# Patient Record
Sex: FEMALE | Race: WHITE | Employment: OTHER | URBAN - METROPOLITAN AREA
[De-identification: names, ages, dates, MRNs, and addresses within clinical notes are randomized per-mention and may not be internally consistent; named-entity substitution may affect disease eponyms.]

---

## 2017-01-23 ENCOUNTER — OFFICE VISIT (OUTPATIENT)
Dept: FAMILY MEDICINE CLINIC | Age: 62
End: 2017-01-23

## 2017-01-23 VITALS
BODY MASS INDEX: 22.19 KG/M2 | SYSTOLIC BLOOD PRESSURE: 104 MMHG | RESPIRATION RATE: 12 BRPM | DIASTOLIC BLOOD PRESSURE: 60 MMHG | WEIGHT: 155 LBS | HEIGHT: 70 IN | HEART RATE: 66 BPM

## 2017-01-23 DIAGNOSIS — Z00.00 WELL ADULT EXAM: Primary | ICD-10-CM

## 2017-01-23 PROCEDURE — 99396 PREV VISIT EST AGE 40-64: CPT | Performed by: FAMILY MEDICINE

## 2017-01-23 ASSESSMENT — PATIENT HEALTH QUESTIONNAIRE - PHQ9
SUM OF ALL RESPONSES TO PHQ QUESTIONS 1-9: 0
1. LITTLE INTEREST OR PLEASURE IN DOING THINGS: 0
2. FEELING DOWN, DEPRESSED OR HOPELESS: 0
SUM OF ALL RESPONSES TO PHQ9 QUESTIONS 1 & 2: 0

## 2017-01-25 LAB
HPV COMMENT: ABNORMAL
HPV TYPE 16: NOT DETECTED
HPV TYPE 18: NOT DETECTED
HPVOH (OTHER TYPES): DETECTED

## 2017-01-27 ENCOUNTER — TELEPHONE (OUTPATIENT)
Dept: FAMILY MEDICINE CLINIC | Age: 62
End: 2017-01-27

## 2017-01-27 DIAGNOSIS — R87.810 CERVICAL HIGH RISK HPV (HUMAN PAPILLOMAVIRUS) TEST POSITIVE: ICD-10-CM

## 2017-01-31 ENCOUNTER — TELEPHONE (OUTPATIENT)
Dept: FAMILY MEDICINE CLINIC | Age: 62
End: 2017-01-31

## 2017-03-27 ENCOUNTER — HOSPITAL ENCOUNTER (OUTPATIENT)
Dept: MAMMOGRAPHY | Age: 62
Discharge: HOME | End: 2017-03-27
Attending: FAMILY MEDICINE | Admitting: FAMILY MEDICINE

## 2017-03-27 DIAGNOSIS — Z12.31 VISIT FOR SCREENING MAMMOGRAM: ICD-10-CM

## 2017-04-05 ENCOUNTER — TELEPHONE (OUTPATIENT)
Dept: FAMILY MEDICINE CLINIC | Age: 62
End: 2017-04-05

## 2017-04-05 ENCOUNTER — HOSPITAL ENCOUNTER (OUTPATIENT)
Dept: WOMENS IMAGING | Age: 62
Discharge: HOME | End: 2017-04-05
Attending: FAMILY MEDICINE | Admitting: FAMILY MEDICINE

## 2017-04-05 DIAGNOSIS — R92.8 ABNORMAL MAMMOGRAM: ICD-10-CM

## 2017-04-05 DIAGNOSIS — R92.8 OTHER ABNORMAL AND INCONCLUSIVE FINDINGS ON DIAGNOSTIC IMAGING OF BREAST: ICD-10-CM

## 2017-04-21 ENCOUNTER — PAT TELEPHONE (OUTPATIENT)
Dept: PREADMISSION TESTING | Age: 62
End: 2017-04-21

## 2017-04-21 VITALS — HEIGHT: 71 IN | BODY MASS INDEX: 21.7 KG/M2 | WEIGHT: 155 LBS

## 2017-04-24 ENCOUNTER — HOSPITAL ENCOUNTER (OUTPATIENT)
Dept: ENDOSCOPY | Age: 62
Discharge: HOME | End: 2017-04-24
Attending: INTERNAL MEDICINE | Admitting: INTERNAL MEDICINE

## 2017-04-24 VITALS
HEIGHT: 71 IN | OXYGEN SATURATION: 98 % | DIASTOLIC BLOOD PRESSURE: 56 MMHG | BODY MASS INDEX: 21.47 KG/M2 | WEIGHT: 153.33 LBS | TEMPERATURE: 97 F | RESPIRATION RATE: 18 BRPM | HEART RATE: 68 BPM | SYSTOLIC BLOOD PRESSURE: 115 MMHG

## 2017-04-24 RX ORDER — SODIUM CHLORIDE 9 MG/ML
INJECTION, SOLUTION INTRAVENOUS CONTINUOUS
Status: DISCONTINUED | OUTPATIENT
Start: 2017-04-24 | End: 2017-04-25 | Stop reason: HOSPADM

## 2017-04-24 RX ORDER — SODIUM CHLORIDE 0.9 % (FLUSH) 0.9 %
10 SYRINGE (ML) INJECTION PRN
Status: DISCONTINUED | OUTPATIENT
Start: 2017-04-24 | End: 2017-04-25 | Stop reason: HOSPADM

## 2017-04-24 RX ORDER — SODIUM CHLORIDE 0.9 % (FLUSH) 0.9 %
10 SYRINGE (ML) INJECTION EVERY 12 HOURS SCHEDULED
Status: DISCONTINUED | OUTPATIENT
Start: 2017-04-24 | End: 2017-04-25 | Stop reason: HOSPADM

## 2017-04-24 RX ORDER — ONDANSETRON 2 MG/ML
4 INJECTION INTRAMUSCULAR; INTRAVENOUS
Status: ACTIVE | OUTPATIENT
Start: 2017-04-24 | End: 2017-04-24

## 2017-04-24 RX ADMIN — SODIUM CHLORIDE: 9 INJECTION, SOLUTION INTRAVENOUS at 08:42

## 2017-04-24 ASSESSMENT — ENCOUNTER SYMPTOMS: SHORTNESS OF BREATH: 0

## 2017-04-24 ASSESSMENT — PAIN SCALES - GENERAL
PAINLEVEL_OUTOF10: 0

## 2017-04-24 ASSESSMENT — PAIN - FUNCTIONAL ASSESSMENT: PAIN_FUNCTIONAL_ASSESSMENT: 0-10

## 2017-10-03 ENCOUNTER — TELEPHONE (OUTPATIENT)
Dept: FAMILY MEDICINE CLINIC | Age: 62
End: 2017-10-03

## 2017-10-03 DIAGNOSIS — R92.8 ABNORMAL MAMMOGRAM OF RIGHT BREAST: Primary | ICD-10-CM

## 2017-10-03 NOTE — TELEPHONE ENCOUNTER
Patient calling because she received a letter in the mail from her last [address] in April that she is due for her 6 month diagnostic mammogram. She is requesting  order this so she can have it done. Please send her a [employer] message when this has been placed so she can schedule.

## 2017-10-03 NOTE — TELEPHONE ENCOUNTER
Call pt and let her know I sent in diagnostic mammogram and an ultrasound of right breast    Please document call and then close encounter.   thanks

## 2017-10-17 ENCOUNTER — HOSPITAL ENCOUNTER (OUTPATIENT)
Dept: WOMENS IMAGING | Age: 62
Discharge: HOME | End: 2017-10-17
Admitting: FAMILY MEDICINE

## 2017-10-17 DIAGNOSIS — R92.8 ABNORMAL MAMMOGRAM OF RIGHT BREAST: ICD-10-CM

## 2017-10-17 DIAGNOSIS — R92.8 OTHER ABNORMAL AND INCONCLUSIVE FINDINGS ON DIAGNOSTIC IMAGING OF BREAST: ICD-10-CM

## 2018-02-21 ENCOUNTER — OFFICE VISIT (OUTPATIENT)
Dept: FAMILY MEDICINE CLINIC | Age: 63
End: 2018-02-21

## 2018-02-21 VITALS
RESPIRATION RATE: 12 BRPM | OXYGEN SATURATION: 96 % | SYSTOLIC BLOOD PRESSURE: 100 MMHG | DIASTOLIC BLOOD PRESSURE: 66 MMHG | WEIGHT: 159 LBS | BODY MASS INDEX: 22.76 KG/M2 | HEART RATE: 74 BPM | HEIGHT: 70 IN

## 2018-02-21 DIAGNOSIS — Z23 NEED FOR HEPATITIS A VACCINATION: ICD-10-CM

## 2018-02-21 DIAGNOSIS — Z01.419 ENCOUNTER FOR GYNECOLOGICAL EXAMINATION WITHOUT ABNORMAL FINDING: Primary | ICD-10-CM

## 2018-02-21 DIAGNOSIS — Z23 NEED FOR HEPATITIS B VACCINATION: ICD-10-CM

## 2018-02-21 DIAGNOSIS — Z11.3 SCREEN FOR STD (SEXUALLY TRANSMITTED DISEASE): ICD-10-CM

## 2018-02-21 LAB
HBV SURFACE AB TITR SER: <3.5 MIU/ML
HEPATITIS B CORE IGM ANTIBODY: NORMAL
HEPATITIS B SURFACE ANTIGEN INTERPRETATION: NORMAL
HEPATITIS C ANTIBODY INTERPRETATION: NORMAL

## 2018-02-21 PROCEDURE — 90471 IMMUNIZATION ADMIN: CPT | Performed by: FAMILY MEDICINE

## 2018-02-21 PROCEDURE — 36415 COLL VENOUS BLD VENIPUNCTURE: CPT | Performed by: FAMILY MEDICINE

## 2018-02-21 PROCEDURE — 90746 HEPB VACCINE 3 DOSE ADULT IM: CPT | Performed by: FAMILY MEDICINE

## 2018-02-21 PROCEDURE — 90632 HEPA VACCINE ADULT IM: CPT | Performed by: FAMILY MEDICINE

## 2018-02-21 PROCEDURE — 99396 PREV VISIT EST AGE 40-64: CPT | Performed by: FAMILY MEDICINE

## 2018-02-21 PROCEDURE — 90472 IMMUNIZATION ADMIN EACH ADD: CPT | Performed by: FAMILY MEDICINE

## 2018-02-21 NOTE — PROGRESS NOTES
Annual GYN Visit      Shilpa Rust  YOB: 1955    Date of Service:  2/21/2018    Chief Complaint:   Roberta Hdz is a 58 y.o. female who presents for routine annual gynecologic visit. HPI:  Patient is postmenopausal- No LMP recorded. Patient is postmenopausal..  She denies amenorrhea, dyspareunia, pelvic pain, genital ulcers, vaginal discharge, vulvar/vaginal itching, vulvar/vaginal irritation/pain, urinary frequency, urinary urgency and urinary incontinence. Menopausal/perimenopausal symptoms: none. Hormone therapy side effects: not applicable. No Known Allergies  Outpatient Prescriptions Marked as Taking for the 2/21/18 encounter (Office Visit) with Kalee Page MD   Medication Sig Dispense Refill    B Complex Vitamins (VITAMIN B COMPLEX PO) Take by mouth      Multiple Vitamins-Minerals (THERAPEUTIC MULTIVITAMIN-MINERALS) tablet Take 1 tablet by mouth daily      Calcium Citrate-Vitamin D (CALCIUM + D PO) Take by mouth      Ascorbic Acid (VITAMIN C) 500 MG tablet Take 500 mg by mouth daily         Preventive Care and Risk Factor Assessment  Health Maintenance   Topic Date Due    Hepatitis C screen  1955    HIV screen  01/13/1969    Shingles Vaccine (1 of 2 - 2 Dose Series) 01/13/2004    Flu vaccine (1) 09/01/2017    Cervical cancer screen  01/23/2018    Breast cancer screen  10/17/2019    Lipid screen  12/19/2021    Colon cancer screen colonoscopy  04/24/2022    DTaP/Tdap/Td vaccine (2 - Td) 11/14/2026      Hx abnormal PAP: yes - normal pap but +HPV one year ago  Sexual activity: not sexually active.   Hx of STD: no  Hx of abnormal mammogram: no  Self-breast exams: yes  FH of breast cancer: no  FH of GYN cancer: no   Exercise: regularly  History   Smoking Status    Never Smoker   Smokeless Tobacco    Never Used      History   Alcohol Use    2.4 oz/week    4 Glasses of wine per week        Immunization History   Administered Date(s) Administered    Tdap

## 2018-02-22 LAB
C TRACH DNA GENITAL QL NAA+PROBE: NEGATIVE
CANDIDA SPECIES, DNA PROBE: NORMAL
GARDNERELLA VAGINALIS, DNA PROBE: NORMAL
HIV AG/AB: NORMAL
HIV ANTIGEN: NORMAL
HIV-1 ANTIBODY: NORMAL
HIV-2 AB: NORMAL
N. GONORRHOEAE DNA: NEGATIVE
RPR: NORMAL
TRICHOMONAS VAGINALIS DNA: NORMAL

## 2018-02-27 ENCOUNTER — TELEPHONE (OUTPATIENT)
Dept: FAMILY MEDICINE CLINIC | Age: 63
End: 2018-02-27

## 2018-02-27 NOTE — TELEPHONE ENCOUNTER
blank    It is unusual that I still dont have a pap back on patient    It says \"collected\" instead of pending    Can you check on this?

## 2018-02-27 NOTE — TELEPHONE ENCOUNTER
Spoke with Melanie Constantino from [employer], and he transferred me to [address] [place] [address] from pathology, states the test is done, but the  Pathology didn't sign the results. He will try to get it sign and fax us the results.

## 2018-02-28 DIAGNOSIS — R87.810 CERVICAL HIGH RISK HPV (HUMAN PAPILLOMAVIRUS) TEST POSITIVE: Primary | ICD-10-CM

## 2018-03-26 ENCOUNTER — NURSE ONLY (OUTPATIENT)
Dept: FAMILY MEDICINE CLINIC | Age: 63
End: 2018-03-26

## 2018-03-26 DIAGNOSIS — Z23 NEED FOR HEPATITIS B VACCINATION: Primary | ICD-10-CM

## 2018-03-26 PROCEDURE — 90471 IMMUNIZATION ADMIN: CPT | Performed by: FAMILY MEDICINE

## 2018-03-26 PROCEDURE — 90746 HEPB VACCINE 3 DOSE ADULT IM: CPT | Performed by: FAMILY MEDICINE

## 2018-04-04 ENCOUNTER — HOSPITAL ENCOUNTER (OUTPATIENT)
Dept: WOMENS IMAGING | Age: 63
Discharge: HOME | End: 2018-04-04
Attending: FAMILY MEDICINE | Admitting: FAMILY MEDICINE

## 2018-04-04 DIAGNOSIS — Z12.31 VISIT FOR SCREENING MAMMOGRAM: ICD-10-CM

## 2018-06-19 ENCOUNTER — PATIENT MESSAGE (OUTPATIENT)
Dept: FAMILY MEDICINE CLINIC | Age: 63
End: 2018-06-19

## 2018-06-19 RX ORDER — CIPROFLOXACIN 500 MG/1
500 TABLET, FILM COATED ORAL 2 TIMES DAILY
Qty: 6 TABLET | Refills: 0 | Status: SHIPPED | OUTPATIENT
Start: 2018-06-19 | End: 2021-07-15 | Stop reason: SDUPTHER

## 2018-06-21 ENCOUNTER — TELEPHONE (OUTPATIENT)
Dept: FAMILY MEDICINE CLINIC | Age: 63
End: 2018-06-21

## 2018-06-22 ENCOUNTER — NURSE ONLY (OUTPATIENT)
Dept: FAMILY MEDICINE CLINIC | Age: 63
End: 2018-06-22

## 2018-06-22 DIAGNOSIS — Z23 NEED FOR HEPATITIS A AND B VACCINATION: Primary | ICD-10-CM

## 2018-06-22 PROCEDURE — 90746 HEPB VACCINE 3 DOSE ADULT IM: CPT | Performed by: FAMILY MEDICINE

## 2018-06-22 PROCEDURE — 90471 IMMUNIZATION ADMIN: CPT | Performed by: FAMILY MEDICINE

## 2018-06-22 PROCEDURE — 90632 HEPA VACCINE ADULT IM: CPT | Performed by: FAMILY MEDICINE

## 2018-06-22 PROCEDURE — 90472 IMMUNIZATION ADMIN EACH ADD: CPT | Performed by: FAMILY MEDICINE

## 2019-04-05 ENCOUNTER — OFFICE VISIT (OUTPATIENT)
Dept: FAMILY MEDICINE CLINIC | Age: 64
End: 2019-04-05
Payer: COMMERCIAL

## 2019-04-05 VITALS
BODY MASS INDEX: 22.48 KG/M2 | SYSTOLIC BLOOD PRESSURE: 115 MMHG | WEIGHT: 157 LBS | HEIGHT: 70 IN | DIASTOLIC BLOOD PRESSURE: 73 MMHG | HEART RATE: 65 BPM

## 2019-04-05 DIAGNOSIS — Z13.220 SCREENING FOR HYPERLIPIDEMIA: ICD-10-CM

## 2019-04-05 DIAGNOSIS — Z12.39 BREAST CANCER SCREENING: ICD-10-CM

## 2019-04-05 DIAGNOSIS — Z23 NEED FOR PNEUMOCOCCAL VACCINATION: ICD-10-CM

## 2019-04-05 DIAGNOSIS — Z00.00 WELL ADULT EXAM: Primary | ICD-10-CM

## 2019-04-05 DIAGNOSIS — Z13.820 SCREENING FOR OSTEOPOROSIS: ICD-10-CM

## 2019-04-05 LAB
ALBUMIN SERPL-MCNC: 4.6 G/DL (ref 3.4–5)
ALP BLD-CCNC: 80 U/L (ref 40–129)
ALT SERPL-CCNC: 18 U/L (ref 10–40)
ANION GAP SERPL CALCULATED.3IONS-SCNC: 10 MMOL/L (ref 3–16)
AST SERPL-CCNC: 17 U/L (ref 15–37)
BASOPHILS ABSOLUTE: 0 K/UL (ref 0–0.2)
BASOPHILS RELATIVE PERCENT: 0.6 %
BILIRUB SERPL-MCNC: 0.4 MG/DL (ref 0–1)
BILIRUBIN DIRECT: <0.2 MG/DL (ref 0–0.3)
BILIRUBIN, INDIRECT: NORMAL MG/DL (ref 0–1)
BUN BLDV-MCNC: 21 MG/DL (ref 7–20)
CALCIUM SERPL-MCNC: 9.5 MG/DL (ref 8.3–10.6)
CHLORIDE BLD-SCNC: 105 MMOL/L (ref 99–110)
CHOLESTEROL, TOTAL: 208 MG/DL (ref 0–199)
CO2: 28 MMOL/L (ref 21–32)
CREAT SERPL-MCNC: 0.6 MG/DL (ref 0.6–1.2)
EOSINOPHILS ABSOLUTE: 0.1 K/UL (ref 0–0.6)
EOSINOPHILS RELATIVE PERCENT: 3.6 %
GFR AFRICAN AMERICAN: >60
GFR NON-AFRICAN AMERICAN: >60
GLUCOSE BLD-MCNC: 87 MG/DL (ref 70–99)
HCT VFR BLD CALC: 41.5 % (ref 36–48)
HDLC SERPL-MCNC: 88 MG/DL (ref 40–60)
HEMOGLOBIN: 13.5 G/DL (ref 12–16)
LDL CHOLESTEROL CALCULATED: 102 MG/DL
LYMPHOCYTES ABSOLUTE: 1.4 K/UL (ref 1–5.1)
LYMPHOCYTES RELATIVE PERCENT: 37 %
MCH RBC QN AUTO: 28.9 PG (ref 26–34)
MCHC RBC AUTO-ENTMCNC: 32.6 G/DL (ref 31–36)
MCV RBC AUTO: 88.9 FL (ref 80–100)
MONOCYTES ABSOLUTE: 0.4 K/UL (ref 0–1.3)
MONOCYTES RELATIVE PERCENT: 9.7 %
NEUTROPHILS ABSOLUTE: 1.9 K/UL (ref 1.7–7.7)
NEUTROPHILS RELATIVE PERCENT: 49.1 %
PDW BLD-RTO: 14.1 % (ref 12.4–15.4)
PLATELET # BLD: 201 K/UL (ref 135–450)
PMV BLD AUTO: 9.8 FL (ref 5–10.5)
POTASSIUM SERPL-SCNC: 4.7 MMOL/L (ref 3.5–5.1)
RBC # BLD: 4.67 M/UL (ref 4–5.2)
SODIUM BLD-SCNC: 143 MMOL/L (ref 136–145)
TOTAL PROTEIN: 6.6 G/DL (ref 6.4–8.2)
TRIGL SERPL-MCNC: 89 MG/DL (ref 0–150)
VLDLC SERPL CALC-MCNC: 18 MG/DL
WBC # BLD: 3.9 K/UL (ref 4–11)

## 2019-04-05 PROCEDURE — 90670 PCV13 VACCINE IM: CPT | Performed by: FAMILY MEDICINE

## 2019-04-05 PROCEDURE — 36415 COLL VENOUS BLD VENIPUNCTURE: CPT | Performed by: FAMILY MEDICINE

## 2019-04-05 PROCEDURE — 99397 PER PM REEVAL EST PAT 65+ YR: CPT | Performed by: FAMILY MEDICINE

## 2019-04-05 PROCEDURE — 90471 IMMUNIZATION ADMIN: CPT | Performed by: FAMILY MEDICINE

## 2019-04-05 ASSESSMENT — PATIENT HEALTH QUESTIONNAIRE - PHQ9
SUM OF ALL RESPONSES TO PHQ QUESTIONS 1-9: 0
2. FEELING DOWN, DEPRESSED OR HOPELESS: 0
1. LITTLE INTEREST OR PLEASURE IN DOING THINGS: 0
SUM OF ALL RESPONSES TO PHQ QUESTIONS 1-9: 0
SUM OF ALL RESPONSES TO PHQ9 QUESTIONS 1 & 2: 0

## 2019-04-05 NOTE — PROGRESS NOTES
Refill    B Complex Vitamins (VITAMIN B COMPLEX PO) Take by mouth      Multiple Vitamins-Minerals (THERAPEUTIC MULTIVITAMIN-MINERALS) tablet Take 1 tablet by mouth daily      Calcium Citrate-Vitamin D (CALCIUM + D PO) Take by mouth      Ibuprofen (ADVIL PO) Take by mouth Indications: prn      Ascorbic Acid (VITAMIN C) 500 MG tablet Take 500 mg by mouth daily      VITAMIN D, ERGOCALCIFEROL, PO Take by mouth         Past Medical History:   Diagnosis Date    Cancer ([address] 2002    hx of melanoma-back of left leg    Cervical high risk HPV (human papillomavirus) test positive     need repeat pap in jan 2018     Past Surgical History:   Procedure Laterality Date    ENDOSCOPY, COLON, DIAGNOSTIC      SKIN BIOPSY      melanoma 2002, back of left leg:  dr Clive Bamberger  2004 2004, Dr Sarai Hutson vein     Family History   Problem Relation Age of Onset    Other Mother         aortic stenosis with replacment    Heart Disease Mother         aotic stenosis    Cancer Father         melanoma     Social History     Socioeconomic History    Marital status:      Spouse name: Not on file    Number of children: Not on file    Years of education: Not on file    Highest education level: Not on file   Occupational History    Not on file   Social Needs    Financial resource strain: Not on file    Food insecurity:     Worry: Not on file     Inability: Not on file    Transportation needs:     Medical: Not on file     Non-medical: Not on file   Tobacco Use    Smoking status: Never Smoker    Smokeless tobacco: Never Used   Substance and Sexual Activity    Alcohol use:  Yes     Alcohol/week: 2.4 oz     Types: 4 Glasses of wine per week    Drug use: No    Sexual activity: Not Currently     Comment:    Lifestyle    Physical activity:     Days per week: Not on file     Minutes per session: Not on file    Stress: Not on file   Relationships    Social connections: Talks on phone: Not on file     Gets together: Not on file     Attends Buddhist service: Not on file     Active member of club or organization: Not on file     Attends meetings of clubs or organizations: Not on file     Relationship status: Not on file    Intimate partner violence:     Fear of current or ex partner: Not on file     Emotionally abused: Not on file     Physically abused: Not on file     Forced sexual activity: Not on file   Other Topics Concern    Not on file   Social History Narrative    Not on file       Review of Systems:  Constitutional:  Negative for activity or appetite change, fever or fatigue  HENT:  Negative for congestion, sinus pressure, or rhinorrhea  Eyes:  Negative for eye pain or visual changes  Resp:  Negative for SOB, chest tightness, cough  Cardiovascular: Negative for CP, palpitations, SUE, orthopnea, PND, LE edema  Gastrointestinal: Negative for abd pain, melena, BRBPR, N/V/D  Endocrine:  Negative for polydipsia and polyuria  :  Negative for dysuria, flank pain or urinary frequency  Musculoskeletal:  Negative for back pain or myalgias  Neuro:  Negative for dizziness or lightheadedness  Psych: negative for depression or anxiety      Physical Exam:   Vitals:    04/05/19 0838   BP: 115/73   Pulse: 65   Weight: 157 lb (71.2 kg)   Height: 5' 9.75\" (1.772 m)     Body mass index is 22.69 kg/m². Constitutional: She is oriented to person, place, and time. She appears well-developed and well-nourished. No distress. HEENT:   Head: Normocephalic and atraumatic. Right Ear: Tympanic membrane, external ear and ear canal normal.   Left Ear: Tympanic membrane, external ear and ear canal normal.   Nose: Nose normal.   Mouth/Throat: Oropharynx is clear and moist, and mucous membranes are normal.  There is no cervical adenopathy. Eyes: Conjunctivae and extraocular motions are normal. Pupils are equal, round, and reactive to light. Neck: Neck supple. No JVD present.  Carotid bruit is not

## 2019-04-13 ENCOUNTER — HOSPITAL ENCOUNTER (OUTPATIENT)
Dept: WOMENS IMAGING | Age: 64
Discharge: HOME | End: 2019-04-13
Payer: COMMERCIAL

## 2019-04-13 DIAGNOSIS — Z12.39 BREAST CANCER SCREENING: ICD-10-CM

## 2019-04-13 PROCEDURE — 77063 BREAST TOMOSYNTHESIS BI: CPT

## 2019-04-19 ENCOUNTER — HOSPITAL ENCOUNTER (OUTPATIENT)
Dept: WOMENS IMAGING | Age: 64
Discharge: HOME | End: 2019-04-19
Payer: COMMERCIAL

## 2019-04-19 DIAGNOSIS — Z13.820 SCREENING FOR OSTEOPOROSIS: ICD-10-CM

## 2019-04-19 PROCEDURE — 77080 DXA BONE DENSITY AXIAL: CPT

## 2019-04-22 ENCOUNTER — TELEPHONE (OUTPATIENT)
Dept: FAMILY MEDICINE CLINIC | Age: 64
End: 2019-04-22

## 2019-04-22 NOTE — TELEPHONE ENCOUNTER
----- Message from Elkin Chen MD sent at 4/21/2019  9:49 PM EDT -----      Inform pt that her bone DEXA scan shows she has some thinning of her bones, but it isn't severe enough to be considered osteoporosis and medication isn't needed at this time    She should repeat imaging in 2 years    Document call

## 2019-05-30 ENCOUNTER — PATIENT MESSAGE (OUTPATIENT)
Dept: FAMILY MEDICINE CLINIC | Age: 64
End: 2019-05-30

## 2019-05-30 NOTE — TELEPHONE ENCOUNTER
From: Justin Villela Lies  To: Bhupinder Kaba MD  Sent: 5/30/2019 8:57 AM EDT  Subject: Non-Urgent Medical Question    I am going to [facility] [facility] at the end of June on a mission trip with [facility]. Wondering if Dr. Carina Kelley recommends any additional shots or preventative treatment for malaria? Please advise. Thank you!

## 2019-06-18 RX ORDER — DOXYCYCLINE HYCLATE 100 MG
TABLET ORAL
Qty: 37 TABLET | Refills: 0 | Status: SHIPPED | OUTPATIENT
Start: 2019-06-18 | End: 2019-09-27 | Stop reason: SDUPTHER

## 2019-09-26 ENCOUNTER — PATIENT MESSAGE (OUTPATIENT)
Dept: FAMILY MEDICINE CLINIC | Age: 64
End: 2019-09-26

## 2019-09-27 RX ORDER — DOXYCYCLINE HYCLATE 100 MG
TABLET ORAL
Qty: 36 TABLET | Refills: 0 | Status: SHIPPED | OUTPATIENT
Start: 2019-09-27 | End: 2021-02-03

## 2019-12-30 ENCOUNTER — OFFICE VISIT (OUTPATIENT)
Dept: FAMILY MEDICINE CLINIC | Age: 64
End: 2019-12-30
Payer: MEDICARE

## 2019-12-30 VITALS
WEIGHT: 158.4 LBS | SYSTOLIC BLOOD PRESSURE: 120 MMHG | RESPIRATION RATE: 16 BRPM | OXYGEN SATURATION: 98 % | BODY MASS INDEX: 22.89 KG/M2 | DIASTOLIC BLOOD PRESSURE: 68 MMHG | HEART RATE: 74 BPM

## 2019-12-30 DIAGNOSIS — B37.2 CANDIDIASIS, INTERTRIGO: Primary | ICD-10-CM

## 2019-12-30 DIAGNOSIS — R30.0 DYSURIA: ICD-10-CM

## 2019-12-30 LAB
BILIRUBIN, POC: NORMAL
BLOOD URINE, POC: NORMAL
CLARITY, POC: NORMAL
COLOR, POC: NORMAL
GLUCOSE URINE, POC: NORMAL
KETONES, POC: NORMAL
LEUKOCYTE EST, POC: NORMAL
NITRITE, POC: NORMAL
PH, POC: 5.5
PROTEIN, POC: NORMAL
SPECIFIC GRAVITY, POC: 1.03
UROBILINOGEN, POC: 0.2

## 2019-12-30 PROCEDURE — 99213 OFFICE O/P EST LOW 20 MIN: CPT | Performed by: INTERNAL MEDICINE

## 2019-12-30 PROCEDURE — 81002 URINALYSIS NONAUTO W/O SCOPE: CPT | Performed by: INTERNAL MEDICINE

## 2019-12-30 RX ORDER — FLUCONAZOLE 150 MG/1
150 TABLET ORAL DAILY
Qty: 3 TABLET | Refills: 0 | Status: SHIPPED | OUTPATIENT
Start: 2019-12-30 | End: 2020-01-02

## 2019-12-31 LAB — URINE CULTURE, ROUTINE: NORMAL

## 2020-01-02 ENCOUNTER — TELEPHONE (OUTPATIENT)
Dept: FAMILY MEDICINE CLINIC | Age: 65
End: 2020-01-02

## 2020-01-02 NOTE — TELEPHONE ENCOUNTER
PT calling in wanting to get a call back regarding her yeast Infection. The Yeast infection is getting better but not cleared up.   She is wanting to know if she should try another cream.

## 2020-04-09 ASSESSMENT — LIFESTYLE VARIABLES
AUDIT-C TOTAL SCORE: 3
HOW OFTEN DURING THE LAST YEAR HAVE YOU BEEN UNABLE TO REMEMBER WHAT HAPPENED THE NIGHT BEFORE BECAUSE YOU HAD BEEN DRINKING: 0
HOW OFTEN DURING THE LAST YEAR HAVE YOU HAD A FEELING OF GUILT OR REMORSE AFTER DRINKING: 0
HOW OFTEN DURING THE LAST YEAR HAVE YOU FOUND THAT YOU WERE NOT ABLE TO STOP DRINKING ONCE YOU HAD STARTED: 0
HOW OFTEN DURING THE LAST YEAR HAVE YOU NEEDED AN ALCOHOLIC DRINK FIRST THING IN THE MORNING TO GET YOURSELF GOING AFTER A NIGHT OF HEAVY DRINKING: 0
HAS A RELATIVE, FRIEND, DOCTOR, OR ANOTHER HEALTH PROFESSIONAL EXPRESSED CONCERN ABOUT YOUR DRINKING OR SUGGESTED YOU CUT DOWN: 0
HOW OFTEN DO YOU HAVE SIX OR MORE DRINKS ON ONE OCCASION: 0
HOW OFTEN DURING THE LAST YEAR HAVE YOU FAILED TO DO WHAT WAS NORMALLY EXPECTED FROM YOU BECAUSE OF DRINKING: 0
AUDIT TOTAL SCORE: 3
HAVE YOU OR SOMEONE ELSE BEEN INJURED AS A RESULT OF YOUR DRINKING: 0
HOW MANY STANDARD DRINKS CONTAINING ALCOHOL DO YOU HAVE ON A TYPICAL DAY: 0
HOW OFTEN DO YOU HAVE A DRINK CONTAINING ALCOHOL: 3

## 2020-04-09 ASSESSMENT — PATIENT HEALTH QUESTIONNAIRE - PHQ9
SUM OF ALL RESPONSES TO PHQ QUESTIONS 1-9: 0
SUM OF ALL RESPONSES TO PHQ QUESTIONS 1-9: 0

## 2020-04-10 ENCOUNTER — VIRTUAL VISIT (OUTPATIENT)
Dept: FAMILY MEDICINE CLINIC | Age: 65
End: 2020-04-10
Payer: MEDICARE

## 2020-04-10 PROCEDURE — G0438 PPPS, INITIAL VISIT: HCPCS | Performed by: NURSE PRACTITIONER

## 2020-04-10 NOTE — PROGRESS NOTES
stenosis    Cancer Father         melanoma       CareTeam (Including outside providers/suppliers regularly involved in providing care):   Patient Care Team:  Mari Crawford MD as PCP - General  [facility]    Wt Readings from Last 3 Encounters:   12/30/19 158 lb 6.4 oz (71.8 kg)   04/05/19 157 lb (71.2 kg)   02/21/18 159 lb (72.1 kg)     There were no vitals filed for this visit. There is no height or weight on file to calculate BMI. Based upon direct observation of the patient, evaluation of cognition reveals recent and remote memory intact. Patient's complete Health Risk Assessment and screening values have been reviewed and are found in Flowsheets. The following problems were reviewed today and where indicated follow up appointments were made and/or referrals ordered.     Positive Risk Factor Screenings with Interventions:     Safety:  Safety  Do you have working smoke detectors?: Yes  Have all throw rugs been removed or fastened?: (!) No  Do you have non-slip mats or surfaces in all bathtubs/showers?: Yes  Do all of your stairways have a railing or banister?: Yes  Are your doorways, halls and stairs free of clutter?: Yes  Do you always fasten your seatbelt when you are in a car?: Yes  Safety Interventions:  · Home safety tips provided    Personalized Preventive Plan   Current Health Maintenance Status  Immunization History   Administered Date(s) Administered    Hepatitis A Adult (Vaqta) 02/21/2018, 06/22/2018    Hepatitis B Adult (Engerix-B) 03/26/2018, 06/22/2018    Hepatitis B Adult (Recombivax HB) 02/21/2018    Pneumococcal Conjugate 13-valent (Winnie Cotta) 04/05/2019    Tdap (Boostrix, Adacel) 11/14/2016        Health Maintenance   Topic Date Due    Shingles Vaccine (1 of 2) 01/13/2004    Annual Wellness Visit (AWV)  12/30/2019    Pneumococcal 65+ years Vaccine (2 of 2 - PPSV23) 04/05/2020    Flu vaccine (Season Ended) 09/01/2020   

## 2020-06-16 ENCOUNTER — TELEPHONE (OUTPATIENT)
Dept: FAMILY MEDICINE CLINIC | Age: 65
End: 2020-06-16

## 2020-06-16 NOTE — TELEPHONE ENCOUNTER
Wanting to schedule routine physical, but wanting to have lab work done prior to the appointment. Wanting to know I you will put the order in for the lab work. Please advise. Pt is reachable at [phone] to schedule the appt.

## 2020-06-17 ENCOUNTER — NURSE ONLY (OUTPATIENT)
Dept: FAMILY MEDICINE CLINIC | Age: 65
End: 2020-06-17
Payer: MEDICARE

## 2020-06-17 VITALS — TEMPERATURE: 97.2 F | BODY MASS INDEX: 22.83 KG/M2 | WEIGHT: 158 LBS

## 2020-06-17 LAB
BASOPHILS ABSOLUTE: 0 K/UL (ref 0–0.2)
BASOPHILS RELATIVE PERCENT: 0.8 %
EOSINOPHILS ABSOLUTE: 0.2 K/UL (ref 0–0.6)
EOSINOPHILS RELATIVE PERCENT: 3.7 %
HCT VFR BLD CALC: 40.6 % (ref 36–48)
HEMOGLOBIN: 13.5 G/DL (ref 12–16)
LYMPHOCYTES ABSOLUTE: 1.5 K/UL (ref 1–5.1)
LYMPHOCYTES RELATIVE PERCENT: 33.4 %
MCH RBC QN AUTO: 30.3 PG (ref 26–34)
MCHC RBC AUTO-ENTMCNC: 33.2 G/DL (ref 31–36)
MCV RBC AUTO: 91.1 FL (ref 80–100)
MONOCYTES ABSOLUTE: 0.4 K/UL (ref 0–1.3)
MONOCYTES RELATIVE PERCENT: 9 %
NEUTROPHILS ABSOLUTE: 2.4 K/UL (ref 1.7–7.7)
NEUTROPHILS RELATIVE PERCENT: 53.1 %
PDW BLD-RTO: 13.8 % (ref 12.4–15.4)
PLATELET # BLD: 200 K/UL (ref 135–450)
PMV BLD AUTO: 10 FL (ref 5–10.5)
RBC # BLD: 4.46 M/UL (ref 4–5.2)
WBC # BLD: 4.5 K/UL (ref 4–11)

## 2020-06-17 PROCEDURE — G0009 ADMIN PNEUMOCOCCAL VACCINE: HCPCS | Performed by: FAMILY MEDICINE

## 2020-06-17 PROCEDURE — 36415 COLL VENOUS BLD VENIPUNCTURE: CPT | Performed by: FAMILY MEDICINE

## 2020-06-17 PROCEDURE — 90732 PPSV23 VACC 2 YRS+ SUBQ/IM: CPT | Performed by: FAMILY MEDICINE

## 2020-06-18 LAB
CHOLESTEROL, TOTAL: 199 MG/DL (ref 0–199)
GLUCOSE BLD-MCNC: 94 MG/DL (ref 70–99)
HDLC SERPL-MCNC: 84 MG/DL (ref 40–60)
LDL CHOLESTEROL CALCULATED: 99 MG/DL
TRIGL SERPL-MCNC: 78 MG/DL (ref 0–150)
VLDLC SERPL CALC-MCNC: 16 MG/DL

## 2020-07-09 ENCOUNTER — HOSPITAL ENCOUNTER (OUTPATIENT)
Dept: WOMENS IMAGING | Age: 65
Discharge: HOME | End: 2020-07-09
Payer: MEDICARE

## 2020-07-09 PROCEDURE — 77063 BREAST TOMOSYNTHESIS BI: CPT

## 2020-08-02 PROBLEM — R87.610 ASCUS OF CERVIX WITH NEGATIVE HIGH RISK HPV: Status: ACTIVE | Noted: 2019-04-17

## 2020-08-02 NOTE — PATIENT INSTRUCTIONS
Get Shingrix vaccines at your pharmacy      General and Vascular Surgery - Chantelle Rivera MD  [address] [address]  [address] [place] [address]  Ph: [phone]

## 2020-08-02 NOTE — PROGRESS NOTES
History and Physical      Radha Rust  YOB: 1955    Date of Service:  8/3/2020    Chief Complaint:   Db Torres is a 76 y.o. female who presents for complete physical examination. HPI:     Has medicare and had AWV in April 2020    Wt Readings from Last 3 Encounters:   08/03/20 161 lb (73 kg)   06/17/20 158 lb (71.7 kg)   12/30/19 158 lb 6.4 oz (71.8 kg)     BP Readings from Last 3 Encounters:   08/03/20 112/68   12/30/19 120/68   04/05/19 115/73       Patient Active Problem List   Diagnosis    History of melanoma    Cervical high risk HPV (human papillomavirus) test positive    Osteopenia    ASCUS of cervix with negative high risk HPV    Umbilical hernia    H/O basal cell carcinoma excision       Preventive Care:  Health Maintenance   Topic Date Due    Shingles Vaccine (1 of 2) 01/13/2004    Flu vaccine (1) 09/01/2020    Annual Wellness Visit (AWV)  04/11/2021    Colon cancer screen colonoscopy  04/24/2022    Breast cancer screen  07/09/2022    Lipid screen  06/17/2025    DTaP/Tdap/Td vaccine (2 - Td) 11/14/2026    DEXA (modify frequency per FRAX score)  Completed    Pneumococcal 65+ years Vaccine  Completed    Hepatitis C screen  Completed    Hepatitis A vaccine  Aged Out    Hepatitis B vaccine  Aged Out    Hib vaccine  Aged Out    Meningococcal (ACWY) vaccine  Aged Out      Hx abnormal PAP:ASCUS with neg HPV last year, followed by dr Shilpa Zavala  Sexual activity: none   Self-breast exams: yes  Previous DEXA scan: osteopenia last year.    Last eye exam: annually  Exercise: walks and jogs  Seatbelt use: yes  Lipid panel:   Lab Results   Component Value Date    CHOL 199 06/17/2020    TRIG 78 06/17/2020    HDL 84 (H) 06/17/2020    LDLCALC 99 06/17/2020     The 10-year ASCVD risk score (Jannet Rose et al., 2013) is: 4.1%    Values used to calculate the score:      Age: 76 years      Sex: Female      Is Non- : No      Diabetic: No      Tobacco smoker: No      Systolic Blood Pressure: 368 mmHg      Is BP treated: No      HDL Cholesterol: 84 mg/dL      Total Cholesterol: 199 mg/dL        Immunization History   Administered Date(s) Administered    Hepatitis A Adult (Vaqta) 02/21/2018, 06/22/2018    Hepatitis B 02/21/2018, 03/26/2018, 06/22/2018    Hepatitis B Adult (Engerix-B) 03/26/2018, 06/22/2018    Hepatitis B Adult (Recombivax HB) 02/21/2018    Pneumococcal Conjugate 13-valent ([id number]) 04/05/2019    Pneumococcal Polysaccharide ([id number]) 06/17/2020    Tdap (Boostrix, Adacel) 11/14/2016    Typhoid Vi capsular polysaccharide (Typhim VI) 06/07/2019       No Known Allergies  Outpatient Medications Marked as Taking for the 8/3/20 encounter (Office Visit) with Cody Mendoza MD   Medication Sig Dispense Refill    valACYclovir (VALTREX) 1 g tablet Take 2 tablets by mouth 2 times daily for 2 doses 4 tablet 0    B Complex Vitamins (VITAMIN B COMPLEX PO) Take by mouth      Multiple Vitamins-Minerals (THERAPEUTIC MULTIVITAMIN-MINERALS) tablet Take 1 tablet by mouth daily      Calcium Citrate-Vitamin D (CALCIUM + D PO) Take by mouth      Ascorbic Acid (VITAMIN C) 500 MG tablet Take 500 mg by mouth daily         Past Medical History:   Diagnosis Date    ASCUS of cervix with negative high risk HPV 04/17/2019    followed by Dr Thony Moya cell adenocarcinoma     skin on left side    Cancer ([facility] [address]    hx of melanoma-back of left leg    Cervical high risk HPV (human papillomavirus) test positive     need repeat pap in jan 2018    Osteopenia     Umbilical hernia      Past Surgical History:   Procedure Laterality Date    ENDOSCOPY, COLON, DIAGNOSTIC      SKIN BIOPSY      melanoma 2002, back of left leg:  dr Yeny Saldivar  2004 2004, Dr Pang Asa vein     Family History   Problem Relation Age of Onset    Other Mother         aortic stenosis with replacment    Heart Disease Mother         aotic stenosis    Cancer Father         melanoma     Social History     Socioeconomic History    Marital status:      Spouse name: Not on file    Number of children: Not on file    Years of education: Not on file    Highest education level: Not on file   Occupational History    Not on file   Social Needs    Financial resource strain: Not on file    Food insecurity     Worry: Not on file     Inability: Not on file    Transportation needs     Medical: Not on file     Non-medical: Not on file   Tobacco Use    Smoking status: Never Smoker    Smokeless tobacco: Never Used   Substance and Sexual Activity    Alcohol use:  Yes     Alcohol/week: 4.0 standard drinks     Types: 4 Glasses of wine per week    Drug use: No    Sexual activity: Not Currently     Comment:    Lifestyle    Physical activity     Days per week: Not on file     Minutes per session: Not on file    Stress: Not on file   Relationships    Social connections     Talks on phone: Not on file     Gets together: Not on file     Attends Latter day service: Not on file     Active member of club or organization: Not on file     Attends meetings of clubs or organizations: Not on file     Relationship status: Not on file    Intimate partner violence     Fear of current or ex partner: Not on file     Emotionally abused: Not on file     Physically abused: Not on file     Forced sexual activity: Not on file   Other Topics Concern    Not on file   Social History Narrative    Not on file       Review of Systems:  Constitutional:  Negative for activity or appetite change, fever or fatigue  HENT:  Negative for congestion, sinus pressure, or rhinorrhea  Eyes:  Negative for eye pain or visual changes  Resp:  Negative for SOB, chest tightness, cough  Cardiovascular: Negative for CP, palpitations, SUE, orthopnea, PND, LE edema  Gastrointestinal: Negative for abd pain, melena, BRBPR, N/V/D  Endocrine:  Negative for polydipsia and polyuria  : and behavior is normal. Judgment, cognition and memory are normal.     Assessment/Plan:    Krissy Acuna was seen today for annual exam.    Diagnoses and all orders for this visit:    Well adult exam  Normal exam.  Patient to continue healthy lifestyle. She now has a history of a basal cell carcinoma and remote history of melanoma, sees Dr. Toribio Pacheco, dermatology, annually. Normal FLP and fasting glucose    Encouraged to get Shingrix vaccines at pharmacy    colonoscoy done 4/24/17:  Next due 2022    Pap smear =ASCUS with neg HPV on 4/17/19: Followed by Dr Ole Buck:  Danis Hu. Pt states she was told doesn't need further paps since HPV was neg. Normal mammogram 7/9/20    DEXA scan showed osteopenia last year. Checking Vit D today. Umbilical hernia without obstruction and without gangrene  Not sure if it is large enough to quite need surgery, but will send to surgeon for their opinion.  -     Gita Wells MD, Vascular Surgery, [facility]        Patient aware that Medicare does not always pay for an annual physical exam.  She states she wants one anyway and will pay if not covered by insurance. Shaun Francis

## 2020-08-03 ENCOUNTER — OFFICE VISIT (OUTPATIENT)
Dept: FAMILY MEDICINE CLINIC | Age: 65
End: 2020-08-03
Payer: MEDICARE

## 2020-08-03 VITALS
SYSTOLIC BLOOD PRESSURE: 112 MMHG | OXYGEN SATURATION: 98 % | HEART RATE: 64 BPM | HEIGHT: 70 IN | RESPIRATION RATE: 16 BRPM | BODY MASS INDEX: 23.05 KG/M2 | DIASTOLIC BLOOD PRESSURE: 68 MMHG | WEIGHT: 161 LBS | TEMPERATURE: 97.4 F

## 2020-08-03 PROBLEM — Z98.890 H/O BASAL CELL CARCINOMA EXCISION: Status: ACTIVE | Noted: 2020-08-03

## 2020-08-03 PROBLEM — Z85.828 H/O BASAL CELL CARCINOMA EXCISION: Status: ACTIVE | Noted: 2020-08-03

## 2020-08-03 LAB — VITAMIN D 25-HYDROXY: 39.6 NG/ML

## 2020-08-03 PROCEDURE — 99397 PER PM REEVAL EST PAT 65+ YR: CPT | Performed by: FAMILY MEDICINE

## 2020-08-03 PROCEDURE — 36415 COLL VENOUS BLD VENIPUNCTURE: CPT | Performed by: FAMILY MEDICINE

## 2020-08-03 RX ORDER — VALACYCLOVIR HYDROCHLORIDE 1 G/1
TABLET, FILM COATED ORAL
Qty: 4 TABLET | Refills: 0 | OUTPATIENT
Start: 2020-08-03

## 2020-08-03 RX ORDER — VALACYCLOVIR HYDROCHLORIDE 1 G/1
2000 TABLET, FILM COATED ORAL 2 TIMES DAILY
Qty: 4 TABLET | Refills: 0 | Status: SHIPPED | OUTPATIENT
Start: 2020-08-03 | End: 2020-09-04 | Stop reason: SDUPTHER

## 2020-08-27 ENCOUNTER — INITIAL CONSULT (OUTPATIENT)
Dept: SURGERY | Age: 65
End: 2020-08-27
Payer: MEDICARE

## 2020-08-27 VITALS
BODY MASS INDEX: 23.05 KG/M2 | HEIGHT: 70 IN | DIASTOLIC BLOOD PRESSURE: 60 MMHG | SYSTOLIC BLOOD PRESSURE: 100 MMHG | WEIGHT: 161 LBS

## 2020-08-27 PROCEDURE — 99203 OFFICE O/P NEW LOW 30 MIN: CPT | Performed by: SURGERY

## 2020-08-27 ASSESSMENT — ENCOUNTER SYMPTOMS
ABDOMINAL DISTENTION: 0
ABDOMINAL PAIN: 0

## 2020-08-27 NOTE — PROGRESS NOTES
Date Taking? Authorizing Provider   doxycycline hyclate (VIBRA-TABS) 100 MG tablet 1 po q day 2 days prior to trip and continue daily unil 4 weeks after return 9/27/19  Yes Cody Mendoza MD   B Complex Vitamins (VITAMIN B COMPLEX PO) Take by mouth   Yes Historical Provider, MD   Multiple Vitamins-Minerals (THERAPEUTIC MULTIVITAMIN-MINERALS) tablet Take 1 tablet by mouth daily   Yes Historical Provider, MD   Calcium Citrate-Vitamin D (CALCIUM + D PO) Take by mouth   Yes Historical Provider, MD   Ibuprofen (ADVIL PO) Take by mouth Indications: prn   Yes Historical Provider, MD   Ascorbic Acid (VITAMIN C) 500 MG tablet Take 500 mg by mouth daily   Yes Historical Provider, MD   VITAMIN D, ERGOCALCIFEROL, PO Take by mouth   Yes Historical Provider, MD         No Known Allergies    Social History     Socioeconomic History    Marital status:      Spouse name: Not on file    Number of children: Not on file    Years of education: Not on file    Highest education level: Not on file   Occupational History    Not on file   Social Needs    Financial resource strain: Not on file    Food insecurity     Worry: Not on file     Inability: Not on file    Transportation needs     Medical: Not on file     Non-medical: Not on file   Tobacco Use    Smoking status: Never Smoker    Smokeless tobacco: Never Used   Substance and Sexual Activity    Alcohol use:  Yes     Alcohol/week: 4.0 standard drinks     Types: 4 Glasses of wine per week    Drug use: No    Sexual activity: Not Currently     Comment:    Lifestyle    Physical activity     Days per week: Not on file     Minutes per session: Not on file    Stress: Not on file   Relationships    Social connections     Talks on phone: Not on file     Gets together: Not on file     Attends Buddhist service: Not on file     Active member of club or organization: Not on file     Attends meetings of clubs or organizations: Not on file     Relationship status: Not on file    Intimate partner violence     Fear of current or ex partner: Not on file     Emotionally abused: Not on file     Physically abused: Not on file     Forced sexual activity: Not on file   Other Topics Concern    Not on file   Social History Narrative    Not on file       Family History   Problem Relation Age of Onset    Other Mother         aortic stenosis with replacment    Heart Disease Mother         aotic stenosis    Cancer Father         melanoma       Review of Systems   Gastrointestinal: Negative for abdominal distention and abdominal pain. All other systems reviewed and are negative. Objective:   Physical Exam  Constitutional:       Appearance: She is well-developed. HENT:      Head: Normocephalic and atraumatic. Neck:      Musculoskeletal: Normal range of motion and neck supple. Thyroid: No thyromegaly. Trachea: No tracheal deviation. Cardiovascular:      Rate and Rhythm: Normal rate. Heart sounds: Normal heart sounds. No murmur. Pulmonary:      Effort: Pulmonary effort is normal.      Breath sounds: Normal breath sounds. Abdominal:      General: There is no distension. Palpations: Abdomen is soft. Tenderness: There is no abdominal tenderness. There is no guarding. Hernia: A hernia (umbilicus) is present. Musculoskeletal: Normal range of motion. Skin:     General: Skin is warm and dry. Neurological:      Mental Status: She is alert and oriented to person, place, and time. Psychiatric:         Thought Content: Thought content normal.       Vitals    Last recorded: 08/27 1038   BP: 100/60       Height: 5' 10\" (1.778 m)       Weight: 161 lb (73 kg)       Assessment:       Diagnosis Orders   1.  Umbilical hernia without obstruction and without gangrene             Plan:      Follow up with me as needed          Paulino Montes MD Post-Procedure Care (Optional): The wound was cleaned, and a pressure dressing was applied.  The patient received detailed post-op instructions. Detail Level: Simple Consent: The patient's consent was obtained including but not limited to risks of crusting, scabbing, blistering, scarring, darker or lighter pigmentary change, recurrence, incomplete removal and infection. Post-Care Instructions: I reviewed with the patient in detail post-care instructions. Patient is to wear sunprotection, and avoid picking at any of the treated lesions. Pt may apply Vaseline to crusted or scabbing areas. Method: electrodesiccation and curettage Anesthesia Volume In Cc: 0.5

## 2021-02-02 NOTE — PROGRESS NOTES
Preoperative Consultation    [facility] ([facility]) Physicians  James Benito [address] [address]  [phone] office  [phone] fax      Kristina Millard  YOB: 1955    Date of Service:  2/3/2021    Vitals:    02/03/21 1005   BP: 110/68   Pulse: 64   Temp: 97.2 °F (36.2 °C)   SpO2: 98%   Weight: 162 lb 12.8 oz (73.8 kg)   Height: 5' 10\" (1.778 m)      Wt Readings from Last 2 Encounters:   02/03/21 162 lb 12.8 oz (73.8 kg)   08/27/20 161 lb (73 kg)     BP Readings from Last 3 Encounters:   02/03/21 110/68   08/27/20 100/60   08/03/20 112/68        Chief Complaint   Patient presents with    Pre-op Exam     lymph node biopsy . 2/9      No Known Allergies  Outpatient Medications Marked as Taking for the 2/3/21 encounter (Office Visit) with YVETTE Nance - CNP   Medication Sig Dispense Refill    B Complex Vitamins (VITAMIN B COMPLEX PO) Take by mouth      Multiple Vitamins-Minerals (THERAPEUTIC MULTIVITAMIN-MINERALS) tablet Take 1 tablet by mouth daily      Calcium Citrate-Vitamin D (CALCIUM + D PO) Take by mouth      Ibuprofen (ADVIL PO) Take by mouth Indications: prn      Ascorbic Acid (VITAMIN C) 500 MG tablet Take 500 mg by mouth daily         This patient presents to the office today for a preoperative consultation at the request of surgeon, Dr. Severo Dutton, who plans on performing [place] Lymph node biopsy on February 9 at [facility].  The current problem began 2 weeks ago.  She was diagnosed with melanoma on her right chest.      Planned anesthesia: General   Known anesthesia problems: None   Bleeding risk: No recent or remote history of abnormal bleeding  Personal or FH of DVT/PE: No    Patient objection to receiving blood products: No    Patient Active Problem List   Diagnosis    History of melanoma    Cervical high risk HPV (human papillomavirus) test positive    Osteopenia    ASCUS of cervix with negative high risk HPV    Umbilical hernia    organizations: Not on file     Relationship status: Not on file    Intimate partner violence     Fear of current or ex partner: Not on file     Emotionally abused: Not on file     Physically abused: Not on file     Forced sexual activity: Not on file   Other Topics Concern    Not on file   Social History Narrative    Not on file       Review of Systems  A comprehensive review of systems was negative except for what was noted in the HPI. Physical Exam   Constitutional: She is oriented to person, place, and time. She appears well-developed and well-nourished. No distress. HENT:   Head: Normocephalic and atraumatic. Mouth/Throat: Uvula is midline, oropharynx is clear and moist and mucous membranes are normal.   Eyes: Conjunctivae and EOM are normal. Pupils are equal, round, and reactive to light. Neck: Trachea normal and normal range of motion. Neck supple. No JVD present. Carotid bruit is not present. No mass and no thyromegaly present. Cardiovascular: Normal rate, regular rhythm, normal heart sounds and intact distal pulses. Exam reveals no gallop and no friction rub. No murmur heard. Pulmonary/Chest: Effort normal and breath sounds normal. No respiratory distress. She has no wheezes. She has no rales. Abdominal: Soft. Normal aorta and bowel sounds are normal. She exhibits no distension and no mass. There is no hepatosplenomegaly. No tenderness. Musculoskeletal: She exhibits no edema and no tenderness. Neurological: She is alert and oriented to person, place, and time. She has normal strength. No cranial nerve deficit or sensory deficit. Coordination and gait normal.   Skin: Skin is warm and dry. No rash noted. No erythema. Psychiatric: She has a normal mood and affect. Her behavior is normal.     EKG Interpretation:  sinus bradycardia.     Lab Review   Lab Results   Component Value Date     04/05/2019    K 4.7 04/05/2019     04/05/2019    CO2 28 04/05/2019    BUN 21 04/05/2019 CREATININE 0.6 04/05/2019    GLUCOSE 94 06/17/2020    CALCIUM 9.5 04/05/2019     Lab Results   Component Value Date    WBC 4.5 06/17/2020    HGB 13.5 06/17/2020    HCT 40.6 06/17/2020    MCV 91.1 06/17/2020     06/17/2020           Assessment:       78 y.o. patient with planned surgery as above. Known risk factors for perioperative complications: None  Current medications which may produce withdrawal symptoms if withheld perioperatively: none      Plan:     1. Preoperative workup as follows: ECG, hemoglobin, hematocrit, electrolytes, creatinine  2. Change in medication regimen before surgery: Hold all medications on morning of surgery  3. Prophylaxis for cardiac events with perioperative beta-blockers: Not indicated  ACC/AHA indications for pre-operative beta-blocker use:    · Vascular surgery with history of postitive stress test  · Intermediate or high risk surgery with history of CAD   · Intermediate or high risk surgery with multiple clinical predictors of CAD- 2 of the following: history of compensated or prior heart failure, history of cerebrovascular disease, DM, or renal insufficiency    Routine administration of higher-dose, long-acting metoprolol in beta-blocker-naïve patients on the day of surgery, and in the absence of dose titration is associated with an overall increase in mortality. Beta-blockers should be started days to weeks prior to surgery and titrated to pulse < 70.  4. Deep vein thrombosis prophylaxis: regimen to be chosen by surgical team  5. No contraindications to planned surgery as long as labs WNL.

## 2021-02-03 ENCOUNTER — OFFICE VISIT (OUTPATIENT)
Dept: FAMILY MEDICINE CLINIC | Age: 66
End: 2021-02-03
Payer: MEDICARE

## 2021-02-03 VITALS
OXYGEN SATURATION: 98 % | HEART RATE: 64 BPM | WEIGHT: 162.8 LBS | DIASTOLIC BLOOD PRESSURE: 68 MMHG | SYSTOLIC BLOOD PRESSURE: 110 MMHG | TEMPERATURE: 97.2 F | BODY MASS INDEX: 23.31 KG/M2 | HEIGHT: 70 IN

## 2021-02-03 DIAGNOSIS — Z01.818 PREOP EXAMINATION: Primary | ICD-10-CM

## 2021-02-03 LAB
ANION GAP SERPL CALCULATED.3IONS-SCNC: 13 MMOL/L (ref 3–16)
BASOPHILS ABSOLUTE: 0 K/UL (ref 0–0.2)
BASOPHILS RELATIVE PERCENT: 0.4 %
BUN BLDV-MCNC: 19 MG/DL (ref 7–20)
CALCIUM SERPL-MCNC: 9.8 MG/DL (ref 8.3–10.6)
CHLORIDE BLD-SCNC: 102 MMOL/L (ref 99–110)
CO2: 24 MMOL/L (ref 21–32)
CREAT SERPL-MCNC: 0.7 MG/DL (ref 0.6–1.2)
EOSINOPHILS ABSOLUTE: 0.1 K/UL (ref 0–0.6)
EOSINOPHILS RELATIVE PERCENT: 2.9 %
GFR AFRICAN AMERICAN: >60
GFR NON-AFRICAN AMERICAN: >60
GLUCOSE BLD-MCNC: 98 MG/DL (ref 70–99)
HCT VFR BLD CALC: 40.8 % (ref 36–48)
HEMOGLOBIN: 13.1 G/DL (ref 12–16)
LYMPHOCYTES ABSOLUTE: 1.4 K/UL (ref 1–5.1)
LYMPHOCYTES RELATIVE PERCENT: 34.4 %
MCH RBC QN AUTO: 29.4 PG (ref 26–34)
MCHC RBC AUTO-ENTMCNC: 32 G/DL (ref 31–36)
MCV RBC AUTO: 91.7 FL (ref 80–100)
MONOCYTES ABSOLUTE: 0.4 K/UL (ref 0–1.3)
MONOCYTES RELATIVE PERCENT: 9.8 %
NEUTROPHILS ABSOLUTE: 2.2 K/UL (ref 1.7–7.7)
NEUTROPHILS RELATIVE PERCENT: 52.5 %
PDW BLD-RTO: 14.6 % (ref 12.4–15.4)
PLATELET # BLD: 187 K/UL (ref 135–450)
PMV BLD AUTO: 9.7 FL (ref 5–10.5)
POTASSIUM SERPL-SCNC: 5 MMOL/L (ref 3.5–5.1)
RBC # BLD: 4.45 M/UL (ref 4–5.2)
SODIUM BLD-SCNC: 139 MMOL/L (ref 136–145)
WBC # BLD: 4.1 K/UL (ref 4–11)

## 2021-02-03 PROCEDURE — 99214 OFFICE O/P EST MOD 30 MIN: CPT | Performed by: NURSE PRACTITIONER

## 2021-02-03 PROCEDURE — 93000 ELECTROCARDIOGRAM COMPLETE: CPT | Performed by: NURSE PRACTITIONER

## 2021-02-03 PROCEDURE — 36415 COLL VENOUS BLD VENIPUNCTURE: CPT | Performed by: NURSE PRACTITIONER

## 2021-02-03 ASSESSMENT — PATIENT HEALTH QUESTIONNAIRE - PHQ9
2. FEELING DOWN, DEPRESSED OR HOPELESS: 0
1. LITTLE INTEREST OR PLEASURE IN DOING THINGS: 0
SUM OF ALL RESPONSES TO PHQ QUESTIONS 1-9: 0

## 2021-02-03 NOTE — LETTER
Preoperative Consultation    [facility] ([facility]) Physicians  KashJames naranjo [address] [address]  [phone] office  [phone] fax      Erik Velasquez  YOB: 1955    Date of Service:  2/3/2021    Vitals:    02/03/21 1005   BP: 110/68   Pulse: 64   Temp: 97.2 °F (36.2 °C)   SpO2: 98%   Weight: 162 lb 12.8 oz (73.8 kg)   Height: 5' 10\" (1.778 m)      Wt Readings from Last 2 Encounters:   02/03/21 162 lb 12.8 oz (73.8 kg)   08/27/20 161 lb (73 kg)     BP Readings from Last 3 Encounters:   02/03/21 110/68   08/27/20 100/60   08/03/20 112/68        Chief Complaint   Patient presents with    Pre-op Exam     lymph node biopsy . 2/9      No Known Allergies  Outpatient Medications Marked as Taking for the 2/3/21 encounter (Office Visit) with YVETTE Osorio - CNP   Medication Sig Dispense Refill    B Complex Vitamins (VITAMIN B COMPLEX PO) Take by mouth      Multiple Vitamins-Minerals (THERAPEUTIC MULTIVITAMIN-MINERALS) tablet Take 1 tablet by mouth daily      Calcium Citrate-Vitamin D (CALCIUM + D PO) Take by mouth      Ibuprofen (ADVIL PO) Take by mouth Indications: prn      Ascorbic Acid (VITAMIN C) 500 MG tablet Take 500 mg by mouth daily         This patient presents to the office today for a preoperative consultation at the request of surgeon, Dr. Jerona Dakins, who plans on performing [place] Lymph node biopsy on February 9 at [facility].  The current problem began 2 weeks ago.  She was diagnosed with melanoma on her right chest.      Planned anesthesia: General   Known anesthesia problems: None   Bleeding risk: No recent or remote history of abnormal bleeding  Personal or FH of DVT/PE: No    Patient objection to receiving blood products: No    Patient Active Problem List   Diagnosis    History of melanoma    Cervical high risk HPV (human papillomavirus) test positive    Osteopenia    ASCUS of cervix with negative high risk HPV    Umbilical hernia CO2 28 04/05/2019    BUN 21 04/05/2019    CREATININE 0.6 04/05/2019    GLUCOSE 94 06/17/2020    CALCIUM 9.5 04/05/2019     Lab Results   Component Value Date    WBC 4.5 06/17/2020    HGB 13.5 06/17/2020    HCT 40.6 06/17/2020    MCV 91.1 06/17/2020     06/17/2020           Assessment:       78 y.o. patient with planned surgery as above. Known risk factors for perioperative complications: None  Current medications which may produce withdrawal symptoms if withheld perioperatively: none      Plan:     1. Preoperative workup as follows: ECG, hemoglobin, hematocrit, electrolytes, creatinine  2. Change in medication regimen before surgery: Hold all medications on morning of surgery  3. Prophylaxis for cardiac events with perioperative beta-blockers: Not indicated  ACC/AHA indications for pre-operative beta-blocker use:    · Vascular surgery with history of postitive stress test  · Intermediate or high risk surgery with history of CAD   · Intermediate or high risk surgery with multiple clinical predictors of CAD- 2 of the following: history of compensated or prior heart failure, history of cerebrovascular disease, DM, or renal insufficiency    Routine administration of higher-dose, long-acting metoprolol in beta-blockernaïve patients on the day of surgery, and in the absence of dose titration is associated with an overall increase in mortality. Beta-blockers should be started days to weeks prior to surgery and titrated to pulse < 70.  4. Deep vein thrombosis prophylaxis: regimen to be chosen by surgical team  5. No contraindications to planned surgery as long as labs WNL.

## 2021-02-04 ENCOUNTER — TELEPHONE (OUTPATIENT)
Dept: FAMILY MEDICINE CLINIC | Age: 66
End: 2021-02-04

## 2021-02-04 NOTE — TELEPHONE ENCOUNTER
Spoke to jennifer at scheduling, advised this was all on [facility]'s desk and would be signed off and faxed tomorrow.

## 2021-02-04 NOTE — TELEPHONE ENCOUNTER
[facility] called requesting copy of PT's EKG tracing be faxed to them: [phone]    Best call back number: [phone]

## 2021-02-24 ENCOUNTER — IMMUNIZATION (OUTPATIENT)
Dept: PRIMARY CARE CLINIC | Age: 66
End: 2021-02-24
Payer: MEDICARE

## 2021-02-24 PROCEDURE — 91300 COVID-19, PFIZER VACCINE 30MCG/0.3ML DOSE: CPT | Performed by: FAMILY MEDICINE

## 2021-02-24 PROCEDURE — 0001A COVID-19, PFIZER VACCINE 30MCG/0.3ML DOSE: CPT | Performed by: FAMILY MEDICINE

## 2021-03-17 ENCOUNTER — IMMUNIZATION (OUTPATIENT)
Dept: PRIMARY CARE CLINIC | Age: 66
End: 2021-03-17
Payer: MEDICARE

## 2021-03-17 PROCEDURE — 91300 COVID-19, PFIZER VACCINE 30MCG/0.3ML DOSE: CPT | Performed by: FAMILY MEDICINE

## 2021-03-17 PROCEDURE — 0002A COVID-19, PFIZER VACCINE 30MCG/0.3ML DOSE: CPT | Performed by: FAMILY MEDICINE

## 2021-07-15 ENCOUNTER — PATIENT MESSAGE (OUTPATIENT)
Dept: PRIMARY CARE CLINIC | Age: 66
End: 2021-07-15

## 2021-07-15 DIAGNOSIS — A09 TRAVELER'S DIARRHEA: Primary | ICD-10-CM

## 2021-07-15 RX ORDER — CIPROFLOXACIN 500 MG/1
500 TABLET, FILM COATED ORAL 2 TIMES DAILY
Qty: 6 TABLET | Refills: 0 | Status: SHIPPED | OUTPATIENT
Start: 2021-07-15 | End: 2022-05-17 | Stop reason: SDUPTHER

## 2021-07-15 NOTE — TELEPHONE ENCOUNTER
From: Addison Atwood LIES  To: Imer Meyers MD  Sent: 7/15/2021 12:53 PM EDT  Subject: Prescription Question    I am going on a mission trip to [facility] next week ( I leave 7/24/21). In the past when I went on similar trips to [facility], Dr. Brett Whelan gave me a prescription to use if I had any problems with traveler's diarrhea. I think it may have been Cipro. Thankfully, I did not need it, but I would like to be prepared again. I realize she is no longer there, and I will be transitioning to Dr. Rayne Corona care (have initial appt. in Oct.). I am contacting you to ask if someone can prescribe this med for me before my trip. I have moved and changed pharmacies recently. I now use [facility] in [address] [facility] [address] [phone]  Thank you.   Robbi Emerson [phone]

## 2021-08-12 ENCOUNTER — HOSPITAL ENCOUNTER (OUTPATIENT)
Dept: WOMENS IMAGING | Age: 66
Discharge: HOME | End: 2021-08-12
Payer: MEDICARE

## 2021-08-12 DIAGNOSIS — Z12.31 VISIT FOR SCREENING MAMMOGRAM: ICD-10-CM

## 2021-08-12 PROCEDURE — 77063 BREAST TOMOSYNTHESIS BI: CPT

## 2021-08-13 DIAGNOSIS — R92.8 ABNORMAL MAMMOGRAM OF RIGHT BREAST: Primary | ICD-10-CM

## 2021-08-25 ENCOUNTER — HOSPITAL ENCOUNTER (OUTPATIENT)
Dept: WOMENS IMAGING | Age: 66
Discharge: HOME | End: 2021-08-25
Payer: MEDICARE

## 2021-08-25 ENCOUNTER — APPOINTMENT (OUTPATIENT)
Dept: ULTRASOUND IMAGING | Age: 66
End: 2021-08-25
Payer: MEDICARE

## 2021-08-25 DIAGNOSIS — R92.8 ABNORMAL MAMMOGRAM OF RIGHT BREAST: ICD-10-CM

## 2021-08-25 PROCEDURE — G0279 TOMOSYNTHESIS, MAMMO: HCPCS

## 2021-10-15 ENCOUNTER — OFFICE VISIT (OUTPATIENT)
Dept: FAMILY MEDICINE CLINIC | Age: 66
End: 2021-10-15
Payer: MEDICARE

## 2021-10-15 VITALS
SYSTOLIC BLOOD PRESSURE: 114 MMHG | RESPIRATION RATE: 16 BRPM | BODY MASS INDEX: 21.84 KG/M2 | WEIGHT: 156 LBS | DIASTOLIC BLOOD PRESSURE: 77 MMHG | HEART RATE: 83 BPM | OXYGEN SATURATION: 99 % | HEIGHT: 71 IN

## 2021-10-15 DIAGNOSIS — M25.9 KNEE PROBLEM: Primary | ICD-10-CM

## 2021-10-15 PROCEDURE — 3288F FALL RISK ASSESSMENT DOCD: CPT | Performed by: FAMILY MEDICINE

## 2021-10-15 PROCEDURE — 99213 OFFICE O/P EST LOW 20 MIN: CPT | Performed by: FAMILY MEDICINE

## 2021-10-15 SDOH — ECONOMIC STABILITY: FOOD INSECURITY: WITHIN THE PAST 12 MONTHS, YOU WORRIED THAT YOUR FOOD WOULD RUN OUT BEFORE YOU GOT MONEY TO BUY MORE.: NEVER TRUE

## 2021-10-15 SDOH — ECONOMIC STABILITY: FOOD INSECURITY: WITHIN THE PAST 12 MONTHS, THE FOOD YOU BOUGHT JUST DIDN'T LAST AND YOU DIDN'T HAVE MONEY TO GET MORE.: NEVER TRUE

## 2021-10-15 ASSESSMENT — SOCIAL DETERMINANTS OF HEALTH (SDOH): HOW HARD IS IT FOR YOU TO PAY FOR THE VERY BASICS LIKE FOOD, HOUSING, MEDICAL CARE, AND HEATING?: NOT HARD AT ALL

## 2021-10-15 NOTE — PROGRESS NOTES
A/P:    Diagnosis Orders   1. Knee problem  Strengthening of quads and hamstrings recommended, she plans to try glucosamine for 6 weeks or Tumeric if her knees would start hurting       Follow-up in 7 months for AWV.    O: /77   Pulse 83   Resp 16   Ht 5' 10.5\" (1.791 m)   Wt 156 lb (70.8 kg)   SpO2 99%   BMI 22.07 kg/m²    Gen- NAD, pleasant  HEENT- Eyes without icterus or injection  Neck- Supple, no lymphadenopathy appreciated  Lungs- CTAB  Heart- RRR  Abd- Soft, non tender  Ext- No edema, I do not appreciate laxity to her knees, she does have weak quads and hamstrings  Psych- Appropriate    S: CC- establish care  HPI- Ms. Hanny Martinez presents to establish care with new clinic provider. She reports that she is doing well. She does have some questions about her knees feeling a bit weaker. She denies pain. She continues to see dermatology for her history of melanoma, basal cell carcinoma. Her father passed away from [address] [facility]. She is up-to-date with her Pap smear, mammogram, colon cancer screening. She had Covid vaccine booster on Oct 7, 2021.     ROS-she denies fever, chills, night sweats  She denies chest pain, shortness of breath, palpitations    Patient's medications, allergies, past medical hx, surgical hx, family hx were reviewed and updated

## 2022-05-17 ENCOUNTER — OFFICE VISIT (OUTPATIENT)
Dept: FAMILY MEDICINE CLINIC | Age: 67
End: 2022-05-17
Payer: MEDICARE

## 2022-05-17 VITALS
SYSTOLIC BLOOD PRESSURE: 108 MMHG | WEIGHT: 154 LBS | BODY MASS INDEX: 22.05 KG/M2 | HEIGHT: 70 IN | OXYGEN SATURATION: 98 % | HEART RATE: 69 BPM | DIASTOLIC BLOOD PRESSURE: 80 MMHG

## 2022-05-17 DIAGNOSIS — Z12.11 COLON CANCER SCREENING: ICD-10-CM

## 2022-05-17 DIAGNOSIS — L50.9 URTICARIA: ICD-10-CM

## 2022-05-17 DIAGNOSIS — Z82.49 FAMILY HISTORY OF HEART DISEASE: ICD-10-CM

## 2022-05-17 DIAGNOSIS — L50.8 OTHER URTICARIA: ICD-10-CM

## 2022-05-17 DIAGNOSIS — Z00.00 ENCOUNTER FOR MEDICARE ANNUAL WELLNESS EXAM: Primary | ICD-10-CM

## 2022-05-17 DIAGNOSIS — M85.89 OSTEOPENIA OF MULTIPLE SITES: ICD-10-CM

## 2022-05-17 DIAGNOSIS — Z86.19 HISTORY OF COLD SORES: ICD-10-CM

## 2022-05-17 DIAGNOSIS — A09 TRAVELER'S DIARRHEA: ICD-10-CM

## 2022-05-17 PROBLEM — C43.59 MALIGNANT MELANOMA OF SKIN OF CHEST (HCC): Status: ACTIVE | Noted: 2021-01-22

## 2022-05-17 LAB
A/G RATIO: 2.5 (ref 1.1–2.2)
ALBUMIN SERPL-MCNC: 4.7 G/DL (ref 3.4–5)
ALP BLD-CCNC: 95 U/L (ref 40–129)
ALT SERPL-CCNC: 18 U/L (ref 10–40)
ANION GAP SERPL CALCULATED.3IONS-SCNC: 14 MMOL/L (ref 3–16)
AST SERPL-CCNC: 17 U/L (ref 15–37)
BILIRUB SERPL-MCNC: 0.4 MG/DL (ref 0–1)
BUN BLDV-MCNC: 20 MG/DL (ref 7–20)
CALCIUM SERPL-MCNC: 9.5 MG/DL (ref 8.3–10.6)
CHLORIDE BLD-SCNC: 102 MMOL/L (ref 99–110)
CHOLESTEROL, TOTAL: 233 MG/DL (ref 0–199)
CO2: 24 MMOL/L (ref 21–32)
CREAT SERPL-MCNC: 0.8 MG/DL (ref 0.6–1.2)
GFR AFRICAN AMERICAN: >60
GFR NON-AFRICAN AMERICAN: >60
GLUCOSE BLD-MCNC: 88 MG/DL (ref 70–99)
HDLC SERPL-MCNC: 90 MG/DL (ref 40–60)
LDL CHOLESTEROL CALCULATED: 124 MG/DL
POTASSIUM SERPL-SCNC: 4.6 MMOL/L (ref 3.5–5.1)
SODIUM BLD-SCNC: 140 MMOL/L (ref 136–145)
TOTAL PROTEIN: 6.6 G/DL (ref 6.4–8.2)
TRIGL SERPL-MCNC: 93 MG/DL (ref 0–150)
VLDLC SERPL CALC-MCNC: 19 MG/DL

## 2022-05-17 PROCEDURE — G0439 PPPS, SUBSEQ VISIT: HCPCS | Performed by: FAMILY MEDICINE

## 2022-05-17 PROCEDURE — 36415 COLL VENOUS BLD VENIPUNCTURE: CPT | Performed by: FAMILY MEDICINE

## 2022-05-17 PROCEDURE — 99212 OFFICE O/P EST SF 10 MIN: CPT | Performed by: FAMILY MEDICINE

## 2022-05-17 RX ORDER — CIPROFLOXACIN 500 MG/1
500 TABLET, FILM COATED ORAL 2 TIMES DAILY
Qty: 6 TABLET | Refills: 0 | Status: SHIPPED | OUTPATIENT
Start: 2022-05-17 | End: 2022-05-20

## 2022-05-17 RX ORDER — VALACYCLOVIR HYDROCHLORIDE 1 G/1
2000 TABLET, FILM COATED ORAL 2 TIMES DAILY
Qty: 4 TABLET | Refills: 5 | Status: SHIPPED | OUTPATIENT
Start: 2022-05-17 | End: 2022-05-18

## 2022-05-17 ASSESSMENT — PATIENT HEALTH QUESTIONNAIRE - PHQ9
SUM OF ALL RESPONSES TO PHQ QUESTIONS 1-9: 0
SUM OF ALL RESPONSES TO PHQ QUESTIONS 1-9: 0
1. LITTLE INTEREST OR PLEASURE IN DOING THINGS: 0
SUM OF ALL RESPONSES TO PHQ QUESTIONS 1-9: 0
2. FEELING DOWN, DEPRESSED OR HOPELESS: 0
SUM OF ALL RESPONSES TO PHQ QUESTIONS 1-9: 0
SUM OF ALL RESPONSES TO PHQ9 QUESTIONS 1 & 2: 0

## 2022-05-17 ASSESSMENT — LIFESTYLE VARIABLES
HOW OFTEN DURING THE LAST YEAR HAVE YOU FOUND THAT YOU WERE NOT ABLE TO STOP DRINKING ONCE YOU HAD STARTED: 0
HOW OFTEN DURING THE LAST YEAR HAVE YOU NEEDED AN ALCOHOLIC DRINK FIRST THING IN THE MORNING TO GET YOURSELF GOING AFTER A NIGHT OF HEAVY DRINKING: 0
HOW OFTEN DURING THE LAST YEAR HAVE YOU BEEN UNABLE TO REMEMBER WHAT HAPPENED THE NIGHT BEFORE BECAUSE YOU HAD BEEN DRINKING: 0
HOW MANY STANDARD DRINKS CONTAINING ALCOHOL DO YOU HAVE ON A TYPICAL DAY: 1 OR 2
HOW OFTEN DO YOU HAVE A DRINK CONTAINING ALCOHOL: 4 OR MORE TIMES A WEEK
HAVE YOU OR SOMEONE ELSE BEEN INJURED AS A RESULT OF YOUR DRINKING: 0
HAS A RELATIVE, FRIEND, DOCTOR, OR ANOTHER HEALTH PROFESSIONAL EXPRESSED CONCERN ABOUT YOUR DRINKING OR SUGGESTED YOU CUT DOWN: 0
HOW OFTEN DURING THE LAST YEAR HAVE YOU HAD A FEELING OF GUILT OR REMORSE AFTER DRINKING: 0
HOW OFTEN DURING THE LAST YEAR HAVE YOU FAILED TO DO WHAT WAS NORMALLY EXPECTED FROM YOU BECAUSE OF DRINKING: 0

## 2022-05-17 NOTE — PROGRESS NOTES
Medicare Annual Wellness Visit    Lane Alfaro is here for Medicare AWV (needs a referral for colonoscopy)    Assessment & Plan   Encounter for Medicare annual wellness exam  Colon cancer screening  -     AFL - Frank Ward MD, Gastroenterology, [facility] SPECIALTY [facility]  Osteopenia of multiple sites  -     DEXA BONE DENSITY AXIAL SKELETON; Future  Family history of heart disease  -     Comprehensive Metabolic Panel  -     LIPID PANEL  Urticaria        - shrimp allergy test  History of cold sores  -     valACYclovir (VALTREX) 1 g tablet; Take 2 tablets by mouth 2 times daily for 2 doses, Disp-4 tablet, R-5Normal  Other urticaria   Traveler's diarrhea  -     ciprofloxacin (CIPRO) 500 MG tablet; Take 1 tablet by mouth 2 times daily for 3 days, Disp-6 tablet, R-0Normal      Recommendations for Preventive Services Due: see orders and patient instructions/AVS.  Recommended screening schedule for the next 5-10 years is provided to the patient in written form: see Patient Instructions/AVS.     Return in about 1 year (around 5/17/2023) for awv. Subjective   Patient reports she is doing well overall, she would like some Cipro to have on hand for when she travels out of the country during the summer, she is used this in the past, she would like some Valtrex refilled to in case she has cold sore flare, she is wondering about a possible shrimp allergy, she previously developed hives when eating shrimp a couple of times, but last time she ate shrimp she did fine, she would like to know for sure if at all possible    Patient's complete Health Risk Assessment and screening values have been reviewed and are found in Flowsheets. The following problems were reviewed today and where indicated follow up appointments were made and/or referrals ordered.     Positive Risk Factor Screenings with Interventions:               General Health and ACP:  General  In general, how would you say your health is?: Very Good  In the past 7 days, have you experienced any of the following: New or Increased Pain, New or Increased Fatigue, Loneliness, Social Isolation, Stress or Anger?: No  Do you get the social and emotional support that you need?: Yes  Do you have a Living Will?: Yes    Advance Directives     Power of Fadi Lira Will ACP-Advance Directive ACP-Power of     Not on File Not on File Not on File Not on File      General Health Risk Interventions:    Continued healthy living encouraged       Hearing/Vision:  Do you or your family notice any trouble with your hearing that hasn't been managed with hearing aids?: No  Do you have difficulty driving, watching TV, or doing any of your daily activities because of your eyesight?: No  Have you had an eye exam within the past year?: (!) No, she agrees to schedule eye appointment  No exam data present    Safety:  Do you have working smoke detectors? :Yes  Do you have any tripping hazards - loose or unsecured carpets or rugs?: No  Do you have any tripping hazards - clutter in doorways, halls, or stairs?: No  Do you have either shower bars, grab bars, non-slip mats or non-slip surfaces in your shower or bathtub?: (!) No, she will consider  Do all of your stairways have a railing or banister?: Yes  Do you always fasten your seatbelt when you are in a car?: Yes         Objective   Vitals:    05/17/22 0902   BP: 108/80   Pulse: 69   SpO2: 98%   Weight: 154 lb (69.9 kg)   Height: 5' 10\" (1.778 m)      Body mass index is 22.1 kg/m². Gen- NAD, pleasant  HEENT- Eyes without icterus or injection  Neck- Supple, no lymphadenopathy appreciated  Lungs- CTAB  Heart- RRR  Abd- Soft, non tender  Ext- No edema  Psych- Appropriate        No Known Allergies  Prior to Visit Medications    Medication Sig Taking?  Authorizing Provider   valACYclovir (VALTREX) 1 g tablet Take 2 tablets by mouth 2 times daily for 2 doses Yes Jeanne Champagne,    ciprofloxacin (CIPRO) 500 MG tablet Take 1 tablet by mouth 2 times daily for 3 days Yes Kathi Champagne,    B Complex Vitamins (VITAMIN B COMPLEX PO) Take by mouth Yes Historical Provider, MD   Multiple Vitamins-Minerals (THERAPEUTIC MULTIVITAMIN-MINERALS) tablet Take 1 tablet by mouth daily Yes Historical Provider, MD   Calcium Citrate-Vitamin D (CALCIUM + D PO) Take by mouth Yes Historical Provider, MD   Ibuprofen (ADVIL PO) Take by mouth Indications: prn Yes Historical Provider, MD   Ascorbic Acid (VITAMIN C) 500 MG tablet Take 500 mg by mouth daily Yes Historical Provider, MD   VITAMIN D, ERGOCALCIFEROL, PO Take by mouth Yes Historical Provider, MD       CareTeam (Including outside providers/suppliers regularly involved in providing care):   Patient Care Team:  Sally Remedies, DO as PCP - General (Family Medicine)  Sally Remedies, DO as PCP - [facility] Empaneled Provider  Maximo Meckel as Care Team Partner (Certified Nurse Practitioner)     Reviewed and updated this visit:  Tobacco  Allergies  Meds  Med Hx  Surg Hx  Soc Hx  Fam Hx

## 2022-05-22 LAB — SHRIMP: <0.1 KU/L (ref 0–0.34)

## 2022-07-22 ENCOUNTER — HOSPITAL ENCOUNTER (OUTPATIENT)
Dept: WOMENS IMAGING | Age: 67
Discharge: HOME | End: 2022-07-22
Payer: MEDICARE

## 2022-07-22 DIAGNOSIS — M85.89 OSTEOPENIA OF MULTIPLE SITES: ICD-10-CM

## 2022-07-22 PROCEDURE — 77080 DXA BONE DENSITY AXIAL: CPT

## 2022-08-25 NOTE — PROGRESS NOTES
[facility] ENDOSCOPY COLONOSCOPY PRE-OPERATIVE INSTRUCTIONS    Procedure date___9/1/2022______  Arrival time___1000_________          Surgery time___1100_________       Clear liquids the day before the procedure. Do not eat or drink anything within 5 hours of your procedure. This includes water chewing gum, mints and ice chips. You may brush your teeth and gargle the morning of your surgery, but do not swallow the water    You may be asked to stop blood thinners such as Coumadin, Plavix, Fragmin, Lovenox, etc., or any anti-inflammatories such as:  Aspirin, Ibuprofen, Advil, Naproxen prior to your procedure. We also ask that you stop any OTC medications such as fish oil, vitamin E, glucosamine, garlic, Multivitamins, COQ 10, etc.    You must make arrangements for a responsible adult to arrive with you and stay in our waiting area during your procedure. They will also need to take you home after your procedure. For your safety you will not be allowed to leave alone or drive yourself home. Also for your safety, it is strongly suggested that someone stay with you the first 24 hours after your procedure. For your comfort, please wear simple loose fitting clothing to the center. Please do not bring valuables. If you have a living will and a durable power of  for healthcare, please bring in a copy.      You will need to bring a photo ID and insurance card    Our goal is to provide you with excellent care so if you have any questions, please contact us at the [facility] at [phone]         Please note these are generalized instructions for all colonoscopy cases, you may be provided with more specific instructions if necessary

## 2022-08-30 ENCOUNTER — ANESTHESIA EVENT (OUTPATIENT)
Dept: ENDOSCOPY | Age: 67
End: 2022-08-30
Payer: MEDICARE

## 2022-09-01 ENCOUNTER — HOSPITAL ENCOUNTER (OUTPATIENT)
Age: 67
Setting detail: OUTPATIENT SURGERY
Discharge: HOME | End: 2022-09-01
Attending: INTERNAL MEDICINE | Admitting: INTERNAL MEDICINE
Payer: MEDICARE

## 2022-09-01 ENCOUNTER — ANESTHESIA (OUTPATIENT)
Dept: ENDOSCOPY | Age: 67
End: 2022-09-01
Payer: MEDICARE

## 2022-09-01 VITALS
HEIGHT: 70 IN | HEART RATE: 72 BPM | TEMPERATURE: 96.9 F | OXYGEN SATURATION: 100 % | BODY MASS INDEX: 21.47 KG/M2 | SYSTOLIC BLOOD PRESSURE: 109 MMHG | DIASTOLIC BLOOD PRESSURE: 58 MMHG | RESPIRATION RATE: 16 BRPM | WEIGHT: 150 LBS

## 2022-09-01 PROCEDURE — 2500000003 HC RX 250 WO HCPCS

## 2022-09-01 PROCEDURE — 3609027000 HC COLONOSCOPY: Performed by: INTERNAL MEDICINE

## 2022-09-01 PROCEDURE — 3700000001 HC ADD 15 MINUTES (ANESTHESIA): Performed by: INTERNAL MEDICINE

## 2022-09-01 PROCEDURE — 7100000011 HC PHASE II RECOVERY - ADDTL 15 MIN: Performed by: INTERNAL MEDICINE

## 2022-09-01 PROCEDURE — 6360000002 HC RX W HCPCS

## 2022-09-01 PROCEDURE — 3700000000 HC ANESTHESIA ATTENDED CARE: Performed by: INTERNAL MEDICINE

## 2022-09-01 PROCEDURE — 7100000010 HC PHASE II RECOVERY - FIRST 15 MIN: Performed by: INTERNAL MEDICINE

## 2022-09-01 PROCEDURE — 2580000003 HC RX 258: Performed by: ANESTHESIOLOGY

## 2022-09-01 RX ORDER — SODIUM CHLORIDE 0.9 % (FLUSH) 0.9 %
5-40 SYRINGE (ML) INJECTION EVERY 12 HOURS SCHEDULED
Status: DISCONTINUED | OUTPATIENT
Start: 2022-09-01 | End: 2022-09-01 | Stop reason: HOSPADM

## 2022-09-01 RX ORDER — SODIUM CHLORIDE 9 MG/ML
INJECTION, SOLUTION INTRAVENOUS PRN
Status: DISCONTINUED | OUTPATIENT
Start: 2022-09-01 | End: 2022-09-01 | Stop reason: HOSPADM

## 2022-09-01 RX ORDER — LIDOCAINE HYDROCHLORIDE 20 MG/ML
INJECTION, SOLUTION EPIDURAL; INFILTRATION; INTRACAUDAL; PERINEURAL PRN
Status: DISCONTINUED | OUTPATIENT
Start: 2022-09-01 | End: 2022-09-01 | Stop reason: SDUPTHER

## 2022-09-01 RX ORDER — SODIUM CHLORIDE 0.9 % (FLUSH) 0.9 %
5-40 SYRINGE (ML) INJECTION PRN
Status: DISCONTINUED | OUTPATIENT
Start: 2022-09-01 | End: 2022-09-01 | Stop reason: HOSPADM

## 2022-09-01 RX ORDER — PROPOFOL 10 MG/ML
INJECTION, EMULSION INTRAVENOUS PRN
Status: DISCONTINUED | OUTPATIENT
Start: 2022-09-01 | End: 2022-09-01 | Stop reason: SDUPTHER

## 2022-09-01 RX ADMIN — PROPOFOL 30 MG: 10 INJECTION, EMULSION INTRAVENOUS at 11:15

## 2022-09-01 RX ADMIN — PROPOFOL 30 MG: 10 INJECTION, EMULSION INTRAVENOUS at 11:12

## 2022-09-01 RX ADMIN — PROPOFOL 30 MG: 10 INJECTION, EMULSION INTRAVENOUS at 11:18

## 2022-09-01 RX ADMIN — SODIUM CHLORIDE: 9 INJECTION, SOLUTION INTRAVENOUS at 10:46

## 2022-09-01 RX ADMIN — PROPOFOL 20 MG: 10 INJECTION, EMULSION INTRAVENOUS at 11:21

## 2022-09-01 RX ADMIN — PROPOFOL 80 MG: 10 INJECTION, EMULSION INTRAVENOUS at 11:09

## 2022-09-01 RX ADMIN — LIDOCAINE HYDROCHLORIDE 60 MG: 20 INJECTION, SOLUTION EPIDURAL; INFILTRATION; INTRACAUDAL; PERINEURAL at 11:09

## 2022-09-01 RX ADMIN — PROPOFOL 10 MG: 10 INJECTION, EMULSION INTRAVENOUS at 11:24

## 2022-09-01 ASSESSMENT — PAIN SCALES - GENERAL
PAINLEVEL_OUTOF10: 0
PAINLEVEL_OUTOF10: 0

## 2022-09-01 ASSESSMENT — ENCOUNTER SYMPTOMS: SHORTNESS OF BREATH: 0

## 2022-09-01 ASSESSMENT — PAIN - FUNCTIONAL ASSESSMENT: PAIN_FUNCTIONAL_ASSESSMENT: NONE - DENIES PAIN

## 2022-09-01 NOTE — DISCHARGE INSTRUCTIONS
Recommendations:    Recommend repeat colonoscopy in 5 years for surveillance purposes. Discharge Instructions for Colonoscopy     Colonoscopy is a visual exam of the lining of the large intestine, also called the bowel or colon, with a colonoscope. A colonoscope is a flexible tube with a light and a viewing device. It allows the doctor to view the inside of the colon through a tiny video camera. Colonoscopy is performed for many reasons: unexplained anemia , pain, diarrhea , bloody stools, cancer screening, among many other reasons. Complications from a colonoscopy are rare. Some possible serious complications include perforated bowel (which might require surgery) and bleeding (which could require blood transfusion ). Minor complications include bloating, gas, and cramping that can last for 1-2 days after the procedure. Because air is put into your colon during the procedure, it is normal to pass large amounts of air from your rectum. You may not have a bowel movement for 1-3 days after the procedure. What You Will Need:  Someone to drive you home after the procedure     Steps to Take:  [address] when you get home. Because the sedative will make you drowsy, don't drive, operate machinery, or make important decisions the day of the procedure. Feelings of bloating, gas, or cramping may persist for 24 hours. Diet -  Try sips of water first. If tolerated, resume bland food (scrambled eggs, toast, soup) first.  If tolerated, resume regular diet or the diet recommended by your physician. Do not drink alcohol for 24 hours. Physical Activity -  Ask your doctor when you will be able to return to work. Do not drive, operate heavy machinery, or do activities that require coordination or balance for 24 hours. Otherwise, return to your normal routine as soon as you are comfortable to do so, which is usually the next day after the procedure.    Medications - When taking medications, it's

## 2022-09-01 NOTE — ANESTHESIA POSTPROCEDURE EVALUATION
Department of Anesthesiology  Postprocedure Note    Patient: Ginger Best  MRN: [id number]  YOB: 1955  Date of evaluation: 9/1/2022      Procedure Summary     Date: 09/01/22 Room / Location: [address]    Anesthesia Start: 1106 Anesthesia Stop: 1128    Procedure: COLONOSCOPY DIAGNOSTIC Diagnosis:       History of colon polyps      (History of colon polyps)    Surgeons: Oksana Vidal MD Responsible Provider: Be Crain MD    Anesthesia Type: MAC ASA Status: 2          Anesthesia Type: No value filed.     Marck Phase I: Marck Score: 10    Marck Phase II: Marck Score: 10      Anesthesia Post Evaluation    Patient location during evaluation: PACU  Patient participation: complete - patient participated  Level of consciousness: awake  Airway patency: patent  Nausea & Vomiting: no nausea and no vomiting  Cardiovascular status: blood pressure returned to baseline  Respiratory status: acceptable  Hydration status: stable  Multimodal analgesia pain management approach

## 2022-09-01 NOTE — H&P
Pre-operative History and Physical    Patient: Tristen Shipman  : 1955  Acct#:     Intended Procedure:  Colonoscopy    HISTORY OF PRESENT ILLNESS:  The patient is a 75 y.o. female  who presents for/due to Surveillance      Past Medical History:        Diagnosis Date    ASCUS of cervix with negative high risk HPV 2019    followed by Dr Ian Camacho, resolved    Basal cell adenocarcinoma     skin on left side    Cancer ([address] 2002    hx of melanoma-back of left leg    Melanoma ([address]     chest    Osteopenia     Umbilical hernia      Past Surgical History:        Procedure Laterality Date    COLONOSCOPY      ENDOSCOPY, COLON, DIAGNOSTIC      MALIGNANT SKIN LESION EXCISION      on chest    SKIN BIOPSY      melanoma , back of left leg:  dr Patiño Gist  2004, Dr Judith Hampton vein     Medications Prior to Admission:   Prior to Admission medications    Medication Sig Start Date End Date Taking? Authorizing Provider   B Complex Vitamins (VITAMIN B COMPLEX PO) Take by mouth    Historical Provider, MD   Multiple Vitamins-Minerals (THERAPEUTIC MULTIVITAMIN-MINERALS) tablet Take 1 tablet by mouth daily    Historical Provider, MD   Calcium Citrate-Vitamin D (CALCIUM + D PO) Take by mouth    Historical Provider, MD   Ibuprofen (ADVIL PO) Take by mouth Indications: prn    Historical Provider, MD   Ascorbic Acid (VITAMIN C) 500 MG tablet Take 500 mg by mouth daily    Historical Provider, MD   VITAMIN D, ERGOCALCIFEROL, PO Take by mouth    Historical Provider, MD       Allergies:  Patient has no known allergies. Social History:   TOBACCO:   reports that she has never smoked. She has never used smokeless tobacco.  ETOH:   reports current alcohol use of about 4.0 standard drinks per week. DRUGS:   reports no history of drug use.     PHYSICAL EXAM:      Vital Signs: /65   Pulse 75   Temp 97.3 °F (36.3 °C) (Temporal)   Resp 14   Ht 5' 10\" (1.778 m)   Wt 150 lb (68 kg)   SpO2 100%   BMI 21.52 kg/m²    Airway: No stridor or wheezing noted. Good air movement  Pulmonary: without wheezes. Clear to auscultation  Cardiac:regular rate and rhythm without loud murmurs  Abdomen:soft, nontender,  Bowel sounds present    Pre-Procedure Assessment / Plan:  1) Colonoscopy    ASA Grade:  ASA 2 - Patient with mild systemic disease with no functional limitations  Mallampati Classification:  Class II    Level of Sedation Plan:Deep sedation    Post Procedure plan: Return to same level of care    I assessed the patient and find that the patient is in satisfactory condition to proceed with the planned procedure and sedation plan. I have explained the risk, benefits, and alternatives to the procedure; the patient understands and agrees to proceed.        Khadijah Alba MD  9/1/2022

## 2022-09-01 NOTE — ANESTHESIA PRE PROCEDURE
Department of Anesthesiology  Preprocedure Note       Name:  Sean Setting   Age:  75 y.o.  :  1955                                          MRN:  [id number]         Date:  2022      Surgeon: Eddie Pardo):  Arcelia Trinidad MD    Procedure: Procedure(s):  COLONOSCOPY DIAGNOSTIC    Medications prior to admission:   Prior to Admission medications    Medication Sig Start Date End Date Taking?  Authorizing Provider   B Complex Vitamins (VITAMIN B COMPLEX PO) Take by mouth    Historical Provider, MD   Multiple Vitamins-Minerals (THERAPEUTIC MULTIVITAMIN-MINERALS) tablet Take 1 tablet by mouth daily    Historical Provider, MD   Calcium Citrate-Vitamin D (CALCIUM + D PO) Take by mouth    Historical Provider, MD   Ibuprofen (ADVIL PO) Take by mouth Indications: prn    Historical Provider, MD   Ascorbic Acid (VITAMIN C) 500 MG tablet Take 500 mg by mouth daily    Historical Provider, MD   VITAMIN D, ERGOCALCIFEROL, PO Take by mouth    Historical Provider, MD       Current medications:    Current Facility-Administered Medications   Medication Dose Route Frequency Provider Last Rate Last Admin    sodium chloride flush 0.9 % injection 5-40 mL  5-40 mL IntraVENous 2 times per day Tae Doe MD        sodium chloride flush 0.9 % injection 5-40 mL  5-40 mL IntraVENous PRN Tae Doe MD        0.9 % sodium chloride infusion   IntraVENous PRN Tae Doe MD 50 mL/hr at 22 1046 New Bag at 22 1046       Allergies:  No Known Allergies    Problem List:    Patient Active Problem List   Diagnosis Code    History of melanoma Z85.820    Cervical high risk HPV (human papillomavirus) test positive R87.810    Osteopenia M85.80    ASCUS of cervix with negative high risk HPV Q82.965    Umbilical hernia N94.3    H/O basal cell carcinoma excision Z98.890, Z85.828    Malignant melanoma of skin of chest [address] [address] C43.59       Past Medical History:        Diagnosis Date    ASCUS of cervix with negative high Lab Results   Component Value Date/Time     05/17/2022 09:39 AM    K 4.6 05/17/2022 09:39 AM     05/17/2022 09:39 AM    CO2 24 05/17/2022 09:39 AM    BUN 20 05/17/2022 09:39 AM    CREATININE 0.8 05/17/2022 09:39 AM    GFRAA >60 05/17/2022 09:39 AM    AGRATIO 2.5 05/17/2022 09:39 AM    LABGLOM >60 05/17/2022 09:39 AM    GLUCOSE 88 05/17/2022 09:39 AM    PROT 6.6 05/17/2022 09:39 AM    CALCIUM 9.5 05/17/2022 09:39 AM    BILITOT 0.4 05/17/2022 09:39 AM    ALKPHOS 95 05/17/2022 09:39 AM    AST 17 05/17/2022 09:39 AM    ALT 18 05/17/2022 09:39 AM       POC Tests: No results for input(s): POCGLU, POCNA, POCK, POCCL, POCBUN, POCHEMO, POCHCT in the last 72 hours.     Coags: No results found for: PROTIME, INR, APTT    HCG (If Applicable): No results found for: PREGTESTUR, PREGSERUM, HCG, HCGQUANT     ABGs: No results found for: PHART, PO2ART, NXN0FIA, HJW5IFE, BEART, Z5HRZCMU     Type & Screen (If Applicable):  No results found for: LABABO, LABRH    Drug/Infectious Status (If Applicable):  No results found for: HIV, HEPCAB    COVID-19 Screening (If Applicable): No results found for: COVID19        Anesthesia Evaluation  Patient summary reviewed and Nursing notes reviewed no history of anesthetic complications:   Airway: Mallampati: I          Dental: normal exam         Pulmonary: breath sounds clear to auscultation      (-) pneumonia, asthma, shortness of breath, recent URI and sleep apnea                           Cardiovascular:  Exercise tolerance: good (>4 METS),       (-) hypertension, valvular problems/murmurs, past MI, CAD, CABG/stent, dysrhythmias,  angina,  SUE and no pulmonary hypertension      Rhythm: regular  Rate: normal                    Neuro/Psych:      (-) seizures, TIA and CVA           GI/Hepatic/Renal:   (+) bowel prep,      (-) liver disease and no renal disease       Endo/Other:        (-) diabetes mellitus, hypothyroidism               Abdominal:             Vascular:     - PVD, DVT and PE.      Other Findings:           Anesthesia Plan      MAC     ASA 2     (77 yo F without significant PMHx presents for colonoscopy. Discussed risks and benefits to sedation including nausea, vomiting, allergic reaction, headache, delayed cognitive recovery, stroke, heart attack, respiratory depression, and death which patient understood and agreed to proceed. The patient was given the opportunity to ask questions and all questions were answered to the patient's satisfaction.  )  Induction: intravenous. Anesthetic plan and risks discussed with patient. Plan discussed with CRNA. This pre-anesthesia assessment may be used as a history and physical.    DOS STAFF ADDENDUM:    Pt seen and examined, chart reviewed (including anesthesia, drug and allergy history). No interval changes to history and physical examination. Anesthetic plan, risks, benefits, alternatives, and personnel involved discussed with patient. Patient verbalized an understanding and agrees to proceed.       Tr Knapp MD  September 1, 2022  10:49 AM

## 2022-09-01 NOTE — OP NOTE
Colonoscopy Procedure Note      Patient: Ginger Best  : 1955  Acct#:     Procedure: Colonoscopy    Date:  2022    Surgeon:  Oksana Vidal MD    Referring Physician:  Kasey Romeo DO    Previous Colonoscopy: YES  Date:    Greater than 3 years: YES    Preoperative Diagnosis:  1. Surveillance     Postoperative Diagnosis:  1. Normal Colonoscopy     Consent:  The patient or their legal guardian has signed a consent, and is aware of the potential risks, benefits, alternatives, and potential complications of this procedure. These include, but are not limited to hemorrhage, bleeding, post procedural pain, perforation, phlebitis, aspiration, hypotension, hypoxia, cardiovascular events such as arryhthmia, and possibly death. Additionally, the possibility of missed colonic polyps and interval colon cancer was discussed in the consent. Anesthesia:  The patient was administered IV propofol per anesthesiology team.  Please see their operative records for full details. Procedure: An informed consent was obtained from the patient after explanation of indications, benefits, possible risks and complications of the procedure. The patient was then taken to the endoscopy suite, placed in the left lateral decubitus position, and the above IV anesthesia was administered. A digital rectal examination was performed and revealed negative without mass, lesions or tenderness. The Olympus video colonoscope was placed in the patient's rectum under digital direction and advanced to the cecum. The cecum was identified by characteristic anatomy and ballottment. The preparation was excellent. The ileocecal valve was identified. The scope was then withdrawn back through the cecum, ascending, transverse, descending, sigmoid colon, and rectum. Careful circumferential examination of the mucosa in these areas demonstrated:     The entire colon was normal with no polyps, masses, inflammation, or other significant abnormalities. The scope was then withdrawn into the rectum and retroflexed. The retroflexed view of the anal verge and rectum demonstrates normal rectum. The scope was straightened, the colon was decompressed and the scope was withdrawn from the patient. The patient tolerated the procedure well and was taken to the PACU in good condition. Estimated Blood Loss (mL): None     Complications: None    Specimens: * No specimens in log *    Impression:  See post-procedure diagnoses. Recommendations:    Recommend repeat colonoscopy in 5 years for surveillance purposes.      MICK Arias [address]  9/1/2022  [phone]

## 2022-11-14 ENCOUNTER — HOSPITAL ENCOUNTER (OUTPATIENT)
Dept: WOMENS IMAGING | Age: 67
Discharge: HOME | End: 2022-11-14
Payer: MEDICARE

## 2022-11-14 DIAGNOSIS — Z12.31 BREAST CANCER SCREENING BY MAMMOGRAM: ICD-10-CM

## 2022-11-14 PROCEDURE — 77063 BREAST TOMOSYNTHESIS BI: CPT

## 2023-04-14 ENCOUNTER — OFFICE VISIT (OUTPATIENT)
Dept: FAMILY MEDICINE CLINIC | Age: 68
End: 2023-04-14
Payer: MEDICARE

## 2023-04-14 VITALS
HEART RATE: 62 BPM | WEIGHT: 150 LBS | DIASTOLIC BLOOD PRESSURE: 72 MMHG | HEIGHT: 70 IN | OXYGEN SATURATION: 98 % | SYSTOLIC BLOOD PRESSURE: 119 MMHG | BODY MASS INDEX: 21.47 KG/M2

## 2023-04-14 DIAGNOSIS — H10.33 ACUTE BACTERIAL CONJUNCTIVITIS OF BOTH EYES: Primary | ICD-10-CM

## 2023-04-14 DIAGNOSIS — J02.9 SORE THROAT: ICD-10-CM

## 2023-04-14 LAB — STREPTOCOCCUS A RNA: NEGATIVE

## 2023-04-14 PROCEDURE — 87651 STREP A DNA AMP PROBE: CPT | Performed by: NURSE PRACTITIONER

## 2023-04-14 PROCEDURE — 99213 OFFICE O/P EST LOW 20 MIN: CPT | Performed by: NURSE PRACTITIONER

## 2023-04-14 PROCEDURE — 1123F ACP DISCUSS/DSCN MKR DOCD: CPT | Performed by: NURSE PRACTITIONER

## 2023-04-14 RX ORDER — TOBRAMYCIN 3 MG/ML
1 SOLUTION/ DROPS OPHTHALMIC EVERY 4 HOURS
Qty: 5 ML | Refills: 0 | Status: SHIPPED | OUTPATIENT
Start: 2023-04-14

## 2023-04-14 RX ORDER — TOBRAMYCIN 3 MG/ML
SOLUTION/ DROPS OPHTHALMIC
COMMUNITY
Start: 2023-04-09 | End: 2023-04-14 | Stop reason: SDUPTHER

## 2023-04-14 SDOH — ECONOMIC STABILITY: FOOD INSECURITY: WITHIN THE PAST 12 MONTHS, THE FOOD YOU BOUGHT JUST DIDN'T LAST AND YOU DIDN'T HAVE MONEY TO GET MORE.: NEVER TRUE

## 2023-04-14 SDOH — ECONOMIC STABILITY: FOOD INSECURITY: WITHIN THE PAST 12 MONTHS, YOU WORRIED THAT YOUR FOOD WOULD RUN OUT BEFORE YOU GOT MONEY TO BUY MORE.: NEVER TRUE

## 2023-04-14 SDOH — ECONOMIC STABILITY: HOUSING INSECURITY
IN THE LAST 12 MONTHS, WAS THERE A TIME WHEN YOU DID NOT HAVE A STEADY PLACE TO SLEEP OR SLEPT IN A SHELTER (INCLUDING NOW)?: NO

## 2023-04-14 SDOH — ECONOMIC STABILITY: INCOME INSECURITY: HOW HARD IS IT FOR YOU TO PAY FOR THE VERY BASICS LIKE FOOD, HOUSING, MEDICAL CARE, AND HEATING?: NOT VERY HARD

## 2023-04-14 ASSESSMENT — PATIENT HEALTH QUESTIONNAIRE - PHQ9
2. FEELING DOWN, DEPRESSED OR HOPELESS: 0
SUM OF ALL RESPONSES TO PHQ QUESTIONS 1-9: 0
SUM OF ALL RESPONSES TO PHQ9 QUESTIONS 1 & 2: 0
SUM OF ALL RESPONSES TO PHQ QUESTIONS 1-9: 0
1. LITTLE INTEREST OR PLEASURE IN DOING THINGS: 0
SUM OF ALL RESPONSES TO PHQ QUESTIONS 1-9: 0
SUM OF ALL RESPONSES TO PHQ QUESTIONS 1-9: 0

## 2023-06-20 ENCOUNTER — PATIENT MESSAGE (OUTPATIENT)
Dept: FAMILY MEDICINE CLINIC | Age: 68
End: 2023-06-20

## 2023-06-20 DIAGNOSIS — A09 TRAVELER'S DIARRHEA: ICD-10-CM

## 2023-06-20 DIAGNOSIS — Z86.19 HISTORY OF COLD SORES: ICD-10-CM

## 2023-06-20 RX ORDER — CIPROFLOXACIN 500 MG/1
500 TABLET, FILM COATED ORAL 2 TIMES DAILY
Qty: 6 TABLET | Refills: 0 | Status: SHIPPED | OUTPATIENT
Start: 2023-06-20 | End: 2023-06-23

## 2023-06-20 RX ORDER — VALACYCLOVIR HYDROCHLORIDE 1 G/1
2000 TABLET, FILM COATED ORAL 2 TIMES DAILY
Qty: 4 TABLET | Refills: 3 | Status: SHIPPED | OUTPATIENT
Start: 2023-06-20 | End: 2024-06-21

## 2023-06-21 NOTE — TELEPHONE ENCOUNTER
From: Eliel Roe Lies  To: Vasuleviswapnil Garland  Sent: 6/20/2023 4:15 PM EDT  Subject: Yearly check-up    Jocelyn Hernandez! First of all, thank you for agreeing to be my health care provider (after my spring visit with a random eye infection). I have a couple questions re: my health care plan. 1. Last wellness visit was in May 2022. Should I schedule another yearly check-up? 2. Blood work was done then. Should I do that again? If so, would it help to do that prior to wellness check? 3. Can you renew two prescriptions for me please? Valacyclovir - for cold sores which don't occur often, but I did have one in May so used med I had. Cipro - in case of traveler's diarrhea on upcoming mission trip to [facility] in July. Please send to [facility] listed in my info. No rush on any of this. Thanks for your help.   Shira Messina

## 2023-06-29 ENCOUNTER — OFFICE VISIT (OUTPATIENT)
Dept: FAMILY MEDICINE CLINIC | Age: 68
End: 2023-06-29

## 2023-06-29 VITALS
HEIGHT: 71 IN | SYSTOLIC BLOOD PRESSURE: 110 MMHG | HEART RATE: 69 BPM | BODY MASS INDEX: 20.3 KG/M2 | DIASTOLIC BLOOD PRESSURE: 73 MMHG | WEIGHT: 145 LBS | OXYGEN SATURATION: 99 %

## 2023-06-29 DIAGNOSIS — R07.9 CHEST PAIN, UNSPECIFIED TYPE: ICD-10-CM

## 2023-06-29 DIAGNOSIS — R94.31 ABNORMAL EKG: ICD-10-CM

## 2023-06-29 DIAGNOSIS — Z00.00 MEDICARE ANNUAL WELLNESS VISIT, SUBSEQUENT: Primary | ICD-10-CM

## 2023-06-29 DIAGNOSIS — Z00.00 WELL ADULT EXAM: ICD-10-CM

## 2023-06-29 DIAGNOSIS — E78.2 MIXED HYPERLIPIDEMIA: Primary | ICD-10-CM

## 2023-06-29 LAB
CHOLEST SERPL-MCNC: 218 MG/DL (ref 0–199)
HDLC SERPL-MCNC: 79 MG/DL (ref 40–60)
LDLC SERPL CALC-MCNC: 126 MG/DL
TRIGL SERPL-MCNC: 65 MG/DL (ref 0–150)
VLDLC SERPL CALC-MCNC: 13 MG/DL

## 2023-06-29 ASSESSMENT — PATIENT HEALTH QUESTIONNAIRE - PHQ9
2. FEELING DOWN, DEPRESSED OR HOPELESS: 0
SUM OF ALL RESPONSES TO PHQ QUESTIONS 1-9: 0
SUM OF ALL RESPONSES TO PHQ QUESTIONS 1-9: 0
SUM OF ALL RESPONSES TO PHQ9 QUESTIONS 1 & 2: 0
SUM OF ALL RESPONSES TO PHQ QUESTIONS 1-9: 0
1. LITTLE INTEREST OR PLEASURE IN DOING THINGS: 0
SUM OF ALL RESPONSES TO PHQ QUESTIONS 1-9: 0

## 2023-06-29 ASSESSMENT — LIFESTYLE VARIABLES
HOW OFTEN DO YOU HAVE A DRINK CONTAINING ALCOHOL: 4 OR MORE TIMES A WEEK
HOW MANY STANDARD DRINKS CONTAINING ALCOHOL DO YOU HAVE ON A TYPICAL DAY: 1 OR 2

## 2023-08-22 ENCOUNTER — OFFICE VISIT (OUTPATIENT)
Dept: CARDIOLOGY CLINIC | Age: 68
End: 2023-08-22
Payer: MEDICARE

## 2023-08-22 VITALS
BODY MASS INDEX: 20.9 KG/M2 | HEART RATE: 52 BPM | DIASTOLIC BLOOD PRESSURE: 64 MMHG | OXYGEN SATURATION: 98 % | WEIGHT: 146 LBS | SYSTOLIC BLOOD PRESSURE: 118 MMHG | HEIGHT: 70 IN

## 2023-08-22 DIAGNOSIS — R94.31 ABNORMAL EKG: Primary | ICD-10-CM

## 2023-08-22 DIAGNOSIS — E78.2 MIXED HYPERLIPIDEMIA: ICD-10-CM

## 2023-08-22 DIAGNOSIS — I44.0 FIRST DEGREE AV BLOCK: ICD-10-CM

## 2023-08-22 PROCEDURE — 93000 ELECTROCARDIOGRAM COMPLETE: CPT | Performed by: INTERNAL MEDICINE

## 2023-08-22 PROCEDURE — 99204 OFFICE O/P NEW MOD 45 MIN: CPT | Performed by: INTERNAL MEDICINE

## 2023-08-22 NOTE — PROGRESS NOTES
[facility]  1955 August 22, 2023    Reason for Consult: Abnormal EKG    CC: \"There was something on my EKG\"     HPI:  The patient is 70 y.o. female with a past medical history significant for hyperlipidemia who presents for evaluation of an abnormal EKG. She reported mid scapula pain that resolved with Advil and heat with no further recurrence at her PCP office visit 6/29/23. She denies any recurrences since. She states her PCP noted a murmur and stated she had an abnormal EKG. She states overall she is feeling well. She denies any new sounding cardiac complaints. She denies any chest pains or worsening shortness of breath. She states that her mother and aunt both underwent TAVR. She reports medication compliance and is tolerating. She denies any abnormal bleeding or bruising. She denies exertional chest pain/pressure, dyspnea at rest, worsening SUE, PND, orthopnea, palpitations, lightheadedness, weight changes, changes in LE edema, and syncope. Review of Systems:  Constitutional: No fatigue, weakness, night sweats or fever. HEENT: No new vision difficulties or ringing in the ears. Respiratory: No new SOB, PND, orthopnea or cough. Cardiovascular: See HPI   GI: No n/v, diarrhea, constipation, abdominal pain or changes in bowel habits. No melena, no hematochezia  : No urinary frequency, urgency, incontinence, hematuria or dysuria. Skin: No cyanosis or skin lesions. Musculoskeletal: No new muscle or joint pain. Neurological: No syncope or TIA-like symptoms.   Psychiatric: No anxiety, insomnia or depression     Past Medical History:   Diagnosis Date    ASCUS of cervix with negative high risk HPV 04/17/2019    followed by Dr Heather Mckeon, resolved    Basal cell adenocarcinoma     skin on left side    Cancer ([address]) 2002    hx of melanoma-back of left leg    Melanoma ([address])     chest    Osteopenia     Umbilical hernia      Past Surgical History:   Procedure

## 2023-11-22 ENCOUNTER — HOSPITAL ENCOUNTER (OUTPATIENT)
Dept: WOMENS IMAGING | Age: 68
Discharge: HOME | End: 2023-11-22
Payer: MEDICARE

## 2023-11-22 VITALS — WEIGHT: 150 LBS | HEIGHT: 70 IN | BODY MASS INDEX: 21.47 KG/M2

## 2023-11-22 DIAGNOSIS — Z12.31 BREAST CANCER SCREENING BY MAMMOGRAM: ICD-10-CM

## 2023-11-22 PROCEDURE — 77063 BREAST TOMOSYNTHESIS BI: CPT

## 2024-08-23 ENCOUNTER — PATIENT MESSAGE (OUTPATIENT)
Dept: FAMILY MEDICINE CLINIC | Age: 69
End: 2024-08-23

## 2024-08-26 NOTE — TELEPHONE ENCOUNTER
Yes patient is due for yearly visit and AWV. We can do both the same day. Please schedule.     Please document call and then close encounter.  thanks